# Patient Record
Sex: MALE | Race: WHITE | Employment: FULL TIME | URBAN - METROPOLITAN AREA
[De-identification: names, ages, dates, MRNs, and addresses within clinical notes are randomized per-mention and may not be internally consistent; named-entity substitution may affect disease eponyms.]

---

## 2017-09-05 NOTE — PATIENT INSTRUCTIONS
Preventive Health Recommendations  Male Ages 40 to 49    Yearly exam:             See your health care provider every year in order to  o   Review health changes.   o   Discuss preventive care.    o   Review your medicines if your doctor has prescribed any.    You should be tested each year for STDs (sexually transmitted diseases) if you re at risk.     Have a cholesterol test every 5 years.     Have a colonoscopy (test for colon cancer) if someone in your family has had colon cancer or polyps before age 50.     After age 45, have a diabetes test (fasting glucose). If you are at risk for diabetes, you should have this test every 3 years.      Talk with your health care provider about whether or not a prostate cancer screening test (PSA) is right for you.    Shots: Get a flu shot each year. Get a tetanus shot every 10 years.     Nutrition:    Eat at least 5 servings of fruits and vegetables daily.     Eat whole-grain bread, whole-wheat pasta and brown rice instead of white grains and rice.     Talk to your provider about Calcium and Vitamin D.     Lifestyle    Exercise for at least 150 minutes a week (30 minutes a day, 5 days a week). This will help you control your weight and prevent disease.     Limit alcohol to one drink per day.     No smoking.     Wear sunscreen to prevent skin cancer.     See your dentist every six months for an exam and cleaning.     Review dietary measures that can be used for controlling blood pressure      Jasvir Garcia MD

## 2017-09-07 ENCOUNTER — OFFICE VISIT (OUTPATIENT)
Dept: FAMILY MEDICINE | Facility: CLINIC | Age: 47
End: 2017-09-07
Payer: COMMERCIAL

## 2017-09-07 VITALS
RESPIRATION RATE: 14 BRPM | HEART RATE: 72 BPM | HEIGHT: 71 IN | DIASTOLIC BLOOD PRESSURE: 85 MMHG | BODY MASS INDEX: 25.93 KG/M2 | TEMPERATURE: 98.1 F | SYSTOLIC BLOOD PRESSURE: 135 MMHG | WEIGHT: 185.2 LBS

## 2017-09-07 DIAGNOSIS — H65.21 CHRONIC SEROUS OTITIS MEDIA OF RIGHT EAR: ICD-10-CM

## 2017-09-07 DIAGNOSIS — Z13.6 CARDIOVASCULAR SCREENING; LDL GOAL LESS THAN 160: ICD-10-CM

## 2017-09-07 DIAGNOSIS — Z00.00 ROUTINE GENERAL MEDICAL EXAMINATION AT A HEALTH CARE FACILITY: Primary | ICD-10-CM

## 2017-09-07 PROCEDURE — 99396 PREV VISIT EST AGE 40-64: CPT | Performed by: FAMILY MEDICINE

## 2017-09-07 ASSESSMENT — PAIN SCALES - GENERAL: PAINLEVEL: NO PAIN (0)

## 2017-09-07 NOTE — PROGRESS NOTES
"SUBJECTIVE:   CC: Eriberto Cordon is an 47 year old male who presents for preventative health visit.     Physical   Annual:     Getting at least 3 servings of Calcium per day::  Yes    Bi-annual eye exam::  NO    Dental care twice a year::  Yes    Sleep apnea or symptoms of sleep apnea::  None    Frequency of exercise::  1 day/week    Duration of exercise::  15-30 minutes    Taking medications regularly::  Not Applicable    Additional concerns today::  No            Concerned since his blood pressure was noted to be \"high\" at an automated blood pressure monitor and possible elevation is dental office. Has not been treated for hypertension in the past.    Today's PHQ-2 Score:   PHQ-2 ( 1999 [employer]) 9/7/2017   Q1: Little interest or pleasure in doing things 0   Q2: Feeling down, depressed or hopeless 0   PHQ-2 Score 0   Q1: Little interest or pleasure in doing things Not at all   Q2: Feeling down, depressed or hopeless Not at all   PHQ-2 Score 0       Abuse: Current or Past(Physical, Sexual or Emotional)- No  Do you feel safe in your environment - Yes    Social History   Substance Use Topics     Smoking status: Never Smoker     Smokeless tobacco: Never Used     Alcohol use Yes      Comment: Maybe once a week or twice     The patient does not drink >3 drinks per day nor >7 drinks per week.    Last PSA: No results found for: PSA    Reviewed orders with patient. Reviewed health maintenance and updated orders accordingly - Yes      Reviewed and updated as needed this visit by clinical staff  Tobacco  Allergies  Med Hx  Surg Hx  Fam Hx  Soc Hx        Reviewed and updated as needed this visit by Provider              ROS:  C: NEGATIVE for fever, chills, change in weight  CONSTITUTIONAL: Exercises regularly, mainly resistance training.  I: NEGATIVE for worrisome rashes, moles or lesions  E: NEGATIVE for vision changes or irritation  ENT: NEGATIVE for ear, mouth and throat problems. Has a history of chronic serous otitis " "on the right. Hearing relatively well-maintained. Has seen in ENT.  R: NEGATIVE for significant cough or SOB  B: NEGATIVE for masses, tenderness or discharge  CV: NEGATIVE for chest pain, palpitations or peripheral edema  GI: NEGATIVE for nausea, abdominal pain, heartburn, or change in bowel habits   male: negative for dysuria, hematuria, decreased urinary stream, erectile dysfunction, urethral discharge  M: NEGATIVE for significant arthralgias or myalgia  N: NEGATIVE for weakness, dizziness or paresthesias  E: NEGATIVE for temperature intolerance, skin/hair changes  H: NEGATIVE for bleeding problems  P: NEGATIVE for changes in mood or affect    OBJECTIVE:   /85  Pulse 72  Temp 98.1  F (36.7  C) (Temporal)  Resp 14  Ht 5' 11.26\" (1.81 m)  Wt 185 lb 3.2 oz (84 kg)  BMI 25.64 kg/m2    EXAM:  GENERAL: healthy, alert and no distress  EYES: Eyes grossly normal to inspection, PERRL and conjunctivae and sclerae normal  HENT: ear canals and TM's normal, nose and mouth without ulcers or lesions except for the right tympanic membrane showing a prior scarred area where a PE tube was in place and possible slight serous fluid. No redness.  NECK: no adenopathy, no asymmetry, masses, or scars and thyroid normal to palpation  RESP: lungs clear to auscultation - no rales, rhonchi or wheezes  BREAST: normal without masses, tenderness or nipple discharge and no palpable axillary masses or adenopathy  CV: regular rate and rhythm, normal S1 S2, no S3 or S4, no murmur, click or rub, no peripheral edema and peripheral pulses strong  ABDOMEN: soft, nontender, no hepatosplenomegaly, no masses and bowel sounds normal   (male): normal male genitalia without lesions or urethral discharge, no hernia  RECTAL: Deferred per patient request.  MS: no gross musculoskeletal defects noted, no edema  SKIN: no suspicious lesions or rashes  NEURO: Normal strength and tone, mentation intact and speech normal  PSYCH: mentation appears " "normal, affect normal/bright  LYMPH: no cervical, supraclavicular, axillary, or inguinal adenopathy    ASSESSMENT/PLAN:   1. Routine general medical examination at a health care facility    General physical examination completed. Appears to be stable with a normal blood pressure. Handouts given for dietary suggestions to help improve overall blood pressure control. Follow-up in 1 year. Also offered random blood pressure checks in the clinic as he desires.  - CBC with platelets; Future  - Basic metabolic panel; Future    2. CARDIOVASCULAR SCREENING; LDL GOAL LESS THAN 160  Return for lipid profile.  - Lipid panel reflex to direct LDL; Future    3. Chronic serous otitis media of right ear   To be followed as needed by ENT.       COUNSELING:   Reviewed preventive health counseling, as reflected in patient instructions  Special attention given to:        Regular exercise       Healthy diet/nutrition       Colon cancer screening       Prostate cancer screening    BP Screening:   Last 3 BP Readings:    BP Readings from Last 3 Encounters:   09/07/17 135/85   05/09/16 128/86   03/21/13 134/86       The following was recommended to the patient:  Re-screen BP within a year and recommended lifestyle modifications       reports that he has never smoked. He has never used smokeless tobacco.      Estimated body mass index is 25.64 kg/(m^2) as calculated from the following:    Height as of this encounter: 5' 11.26\" (1.81 m).    Weight as of this encounter: 185 lb 3.2 oz (84 kg).     BMI normal based on body shape and habitus.        Counseling Resources:  ATP IV Guidelines  Pooled Cohorts Equation Calculator  FRAX Risk Assessment  ICSI Preventive Guidelines  Dietary Guidelines for Americans, 2010  USDA's MyPlate  ASA Prophylaxis  Lung CA Screening    Return for fasting blood studies. Follow-up in 1 year or as needed.    The patient understood the rational for the diagnosis and treatment plan. All questions were answered to best of " my ability and the patient's satisfaction.    Note: Chart documentation done in part with Dragon Voice Recognition software. Although reviewed after completion, some word and grammatical errors may remain.  Please consider this when interpreting information in this chart.    Jasvir Garcia MD  [facility]

## 2017-09-07 NOTE — MR AVS SNAPSHOT
After Visit Summary   9/7/2017    Eriberto Cordon    MRN: [id number]           Patient Information     Date Of Birth          1970        Visit Information        Provider Department      9/7/2017 10:20 AM Jasvir Garcia MD [facility]        Today's Diagnoses     Routine general medical examination at a health care facility    -  1    CARDIOVASCULAR SCREENING; LDL GOAL LESS THAN 160        Chronic serous otitis media of right ear          Care Instructions      Preventive Health Recommendations  Male Ages 40 to 49    Yearly exam:             See your health care provider every year in order to  o   Review health changes.   o   Discuss preventive care.    o   Review your medicines if your doctor has prescribed any.    You should be tested each year for STDs (sexually transmitted diseases) if you re at risk.     Have a cholesterol test every 5 years.     Have a colonoscopy (test for colon cancer) if someone in your family has had colon cancer or polyps before age 50.     After age 45, have a diabetes test (fasting glucose). If you are at risk for diabetes, you should have this test every 3 years.      Talk with your health care provider about whether or not a prostate cancer screening test (PSA) is right for you.    Shots: Get a flu shot each year. Get a tetanus shot every 10 years.     Nutrition:    Eat at least 5 servings of fruits and vegetables daily.     Eat whole-grain bread, whole-wheat pasta and brown rice instead of white grains and rice.     Talk to your provider about Calcium and Vitamin D.     Lifestyle    Exercise for at least 150 minutes a week (30 minutes a day, 5 days a week). This will help you control your weight and prevent disease.     Limit alcohol to one drink per day.     No smoking.     Wear sunscreen to prevent skin cancer.     See your dentist every six months for an exam and cleaning.     Review dietary measures that can be used for controlling blood  "pressure      Jasvir Garcia MD               Follow-ups after your visit        Follow-up notes from your care team     Return in about 1 year (around 9/7/2018) for Physical Exam, Lab Work.      Future tests that were ordered for you today     Open Future Orders        Priority Expected Expires Ordered    CBC with platelets Routine 9/8/2017 10/31/2017 9/7/2017    Lipid panel reflex to direct LDL Routine 9/8/2017 10/31/2017 9/7/2017    Basic metabolic panel Routine 9/8/2017 10/31/2017 9/7/2017            Who to contact     If you have questions or need follow up information about today's clinic visit or your schedule please contact [facility] directly at [phone].  Normal or non-critical lab and imaging results will be communicated to you by [employer], letter or phone within 4 business days after the clinic has received the results. If you do not hear from us within 7 days, please contact the clinic through [employer] or phone. If you have a critical or abnormal lab result, we will notify you by phone as soon as possible.  Submit refill requests through [employer] or call your pharmacy and they will forward the refill request to us. Please allow 3 business days for your refill to be completed.          Additional Information About Your Visit        [email or web address] Information     [employer] gives you secure access to your electronic health record. If you see a primary care provider, you can also send messages to your care team and make appointments. If you have questions, please call your primary care clinic.  If you do not have a primary care provider, please call [phone] and they will assist you.        Care EveryWhere ID     This is your Care EveryWhere ID. This could be used by other organizations to access your [place] medical records  [id number]        Your Vitals Were     Pulse Temperature Respirations Height BMI (Body Mass Index)       72 98.1  F (36.7  C) (Temporal) 14 5' 11.26\" (1.81 m) 25.64 kg/m2  "       Blood Pressure from Last 3 Encounters:   09/07/17 135/85   05/09/16 128/86   03/21/13 134/86    Weight from Last 3 Encounters:   09/07/17 185 lb 3.2 oz (84 kg)   05/09/16 182 lb (82.6 kg)   03/21/13 188 lb 12.8 oz (85.6 kg)                 Today's Medication Changes          These changes are accurate as of: 9/7/17 11:09 AM.  If you have any questions, ask your nurse or doctor.               These medicines have changed or have updated prescriptions.        Dose/Directions    scopolamine 72 hr patch   Commonly known as:  TRANSDERM   This may have changed:  Another medication with the same name was removed. Continue taking this medication, and follow the directions you see here.   Used for:  Motion sickness, initial encounter   Changed by:  Ruperto Pillai MD        Place 1 patch onto the skin every 72 hours.  Apply to hairless area behind one ear at least 4 hours before travel.  Remove old patch and change every 3 days.   Quantity:  4 patch   Refills:  0         Stop taking these medicines if you haven't already. Please contact your care team if you have questions.     cephALEXin 500 MG capsule   Commonly known as:  KEFLEX   Stopped by:  Jasvir Garcia MD                    Primary Care Provider Office Phone # Fax #    Ruperto Pillai [phone]       [address]  [facility] [address]        Equal Access to Services     [facility] AH: Hadii migdalia ku hadasho Soomaali, waaxda luqadaha, qaybta kaalmada adeegyada, bladimir maxwell. So [facility] [phone].    ATENCIÓN: Si habla español, tiene a snider disposición servicios gratuitos de asistencia lingüística. Llhipolito al [phone].    We comply with applicable federal civil rights laws and [place] laws. We do not discriminate on the basis of race, color, national origin, age, disability sex, sexual orientation or gender identity.            Thank you!     Thank you for choosing [facility]  for your care. Our goal  is always to provide you with excellent care. Hearing back from our patients is one way we can continue to improve our services. Please take a few minutes to complete the written survey that you may receive in the mail after your visit with us. Thank you!             Your Updated Medication List - Protect others around you: Learn how to safely use, store and throw away your medicines at www.disposemymeds.org.          This list is accurate as of: 9/7/17 11:09 AM.  Always use your most recent med list.                   Brand Name Dispense Instructions for use Diagnosis    fluticasone 50 MCG/ACT spray    FLONASE    1 Package    Spray 1-2 sprays into both nostrils daily.    Ear problem       scopolamine 72 hr patch    TRANSDERM    4 patch    Place 1 patch onto the skin every 72 hours.  Apply to hairless area behind one ear at least 4 hours before travel.  Remove old patch and change every 3 days.    Motion sickness, initial encounter

## 2017-09-07 NOTE — NURSING NOTE
"Chief Complaint   Patient presents with     Panel Management     flu shot     Physical       Initial /88  Pulse 72  Temp 98.1  F (36.7  C) (Temporal)  Resp 14  Ht 5' 11.26\" (1.81 m)  Wt 185 lb 3.2 oz (84 kg)  BMI 25.64 kg/m2 Estimated body mass index is 25.64 kg/(m^2) as calculated from the following:    Height as of this encounter: 5' 11.26\" (1.81 m).    Weight as of this encounter: 185 lb 3.2 oz (84 kg).  Medication Reconciliation: complete   April TARYN Santizo        "

## 2017-09-19 DIAGNOSIS — Z13.6 CARDIOVASCULAR SCREENING; LDL GOAL LESS THAN 160: ICD-10-CM

## 2017-09-19 DIAGNOSIS — Z00.00 ROUTINE GENERAL MEDICAL EXAMINATION AT A HEALTH CARE FACILITY: ICD-10-CM

## 2017-09-19 LAB
ANION GAP SERPL CALCULATED.3IONS-SCNC: 9 MMOL/L (ref 3–14)
BUN SERPL-MCNC: 10 MG/DL (ref 7–30)
CALCIUM SERPL-MCNC: 8.8 MG/DL (ref 8.5–10.1)
CHLORIDE SERPL-SCNC: 104 MMOL/L (ref 94–109)
CHOLEST SERPL-MCNC: 192 MG/DL
CO2 SERPL-SCNC: 27 MMOL/L (ref 20–32)
CREAT SERPL-MCNC: 0.99 MG/DL (ref 0.66–1.25)
ERYTHROCYTE [DISTWIDTH] IN BLOOD BY AUTOMATED COUNT: 13 % (ref 10–15)
GFR SERPL CREATININE-BSD FRML MDRD: 82 ML/MIN/1.7M2
GLUCOSE SERPL-MCNC: 89 MG/DL (ref 70–99)
HCT VFR BLD AUTO: 46.1 % (ref 40–53)
HDLC SERPL-MCNC: 55 MG/DL
HGB BLD-MCNC: 15.4 G/DL (ref 13.3–17.7)
LDLC SERPL CALC-MCNC: 116 MG/DL
MCH RBC QN AUTO: 29.6 PG (ref 26.5–33)
MCHC RBC AUTO-ENTMCNC: 33.4 G/DL (ref 31.5–36.5)
MCV RBC AUTO: 89 FL (ref 78–100)
NONHDLC SERPL-MCNC: 137 MG/DL
PLATELET # BLD AUTO: 238 10E9/L (ref 150–450)
POTASSIUM SERPL-SCNC: 3.9 MMOL/L (ref 3.4–5.3)
RBC # BLD AUTO: 5.21 10E12/L (ref 4.4–5.9)
SODIUM SERPL-SCNC: 140 MMOL/L (ref 133–144)
TRIGL SERPL-MCNC: 107 MG/DL
WBC # BLD AUTO: 8.3 10E9/L (ref 4–11)

## 2017-09-19 PROCEDURE — 36415 COLL VENOUS BLD VENIPUNCTURE: CPT | Performed by: FAMILY MEDICINE

## 2017-09-19 PROCEDURE — 80048 BASIC METABOLIC PNL TOTAL CA: CPT | Performed by: FAMILY MEDICINE

## 2017-09-19 PROCEDURE — 80061 LIPID PANEL: CPT | Performed by: FAMILY MEDICINE

## 2017-09-19 PROCEDURE — 85027 COMPLETE CBC AUTOMATED: CPT | Performed by: FAMILY MEDICINE

## 2017-09-25 ENCOUNTER — TELEPHONE (OUTPATIENT)
Dept: FAMILY MEDICINE | Facility: CLINIC | Age: 47
End: 2017-09-25

## 2017-09-25 NOTE — TELEPHONE ENCOUNTER
Reason for call:  Form  Reason for Call:  Form, our goal is to have forms completed with 72 hours, however, some forms may require a visit or additional information.    Type of letter, form or note:  medical    Who is the form from?: [facility]    Where did the form come from: form was faxed in    What clinic location was the form placed at?: [facility] - [phone]    Where the form was placed:  in box     What number is listed as a contact on the form?: [phone]       Additional comments: Fax to [phone] or email if available to: [email or web address]    Call taken on 11/8/2016 at 3:08 PM by Mary Culver

## 2017-09-26 NOTE — TELEPHONE ENCOUNTER
Forms signed and faxed    Left message asking patient to return call.  Please ask patient what his waist circumference is - and we will fax the form again with that information on it!

## 2017-09-26 NOTE — TELEPHONE ENCOUNTER
Not able to do remotely--could it be reviewed by another provider for completion. Inform the patient if not able to be done and will be done early tomorrow morning.    Jasvir Garcia MD

## 2017-09-26 NOTE — TELEPHONE ENCOUNTER
Pt called again.  I will ask SF to complete the form and fax back asap.    Please call pt once this has been completed.

## 2018-01-29 ENCOUNTER — OFFICE VISIT (OUTPATIENT)
Dept: AUDIOLOGY | Facility: CLINIC | Age: 48
End: 2018-01-29
Payer: COMMERCIAL

## 2018-01-29 ENCOUNTER — OFFICE VISIT (OUTPATIENT)
Dept: OTOLARYNGOLOGY | Facility: CLINIC | Age: 48
End: 2018-01-29
Payer: COMMERCIAL

## 2018-01-29 VITALS
BODY MASS INDEX: 26.32 KG/M2 | HEART RATE: 67 BPM | DIASTOLIC BLOOD PRESSURE: 90 MMHG | WEIGHT: 188 LBS | SYSTOLIC BLOOD PRESSURE: 156 MMHG | HEIGHT: 71 IN

## 2018-01-29 DIAGNOSIS — H90.11 CONDUCTIVE HEARING LOSS OF RIGHT EAR WITH UNRESTRICTED HEARING OF LEFT EAR: Primary | ICD-10-CM

## 2018-01-29 DIAGNOSIS — H65.91 OME (OTITIS MEDIA WITH EFFUSION), RIGHT: Primary | ICD-10-CM

## 2018-01-29 PROCEDURE — 92557 COMPREHENSIVE HEARING TEST: CPT | Performed by: AUDIOLOGIST

## 2018-01-29 PROCEDURE — 99203 OFFICE O/P NEW LOW 30 MIN: CPT | Mod: 25 | Performed by: OTOLARYNGOLOGY

## 2018-01-29 PROCEDURE — 92504 EAR MICROSCOPY EXAMINATION: CPT | Performed by: OTOLARYNGOLOGY

## 2018-01-29 PROCEDURE — 92567 TYMPANOMETRY: CPT | Performed by: AUDIOLOGIST

## 2018-01-29 NOTE — MR AVS SNAPSHOT
After Visit Summary   1/29/2018    Eriberto Cordon    MRN: [id number]           Patient Information     Date Of Birth          1970        Visit Information        Provider Department      1/29/2018 11:30 AM Angela Banegas MD [facility]         Follow-ups after your visit        Your next 10 appointments already scheduled     Feb 26, 2018 11:45 AM CST   Return Visit with Angela Banegas MD   [facility]    [address] [place] [id number] [phone]              Who to contact     If you have questions or need follow up information about today's clinic visit or your schedule please contact [facility] [[email or web address] directly at [phone].  Normal or non-critical lab and imaging results will be communicated to you by "[employer]hart, letter or phone within 4 business days after the clinic has received the results. If you do not hear from us within 7 days, please contact the clinic through "[employer]hart or phone. If you have a critical or abnormal lab result, we will notify you by phone as soon as possible.  Submit refill requests through VouchAR or call your pharmacy and they will forward the refill request to us. Please allow 3 business days for your refill to be completed.          Additional Information About Your Visit        "[employer]hart Information     [employer] gives you secure access to your electronic health record. If you see a primary care provider, you can also send messages to your care team and make appointments. If you have questions, please call your primary care clinic.  If you do not have a primary care provider, please call [phone] and they will assist you.      VouchAR is an electronic gateway that provides easy, online access to your medical records. With [employer], you can request a clinic appointment, read your test results, renew a prescription or communicate with your care team.     To  "access your existing account, please contact your [facility] or call [phone] for assistance.        Care EveryWhere ID     This is your Care EveryWhere ID. This could be used by other organizations to access your [place] medical records  [id number]        Your Vitals Were     Pulse Height BMI (Body Mass Index)             67 1.803 m (5' 11\") 26.22 kg/m2          Blood Pressure from Last 3 Encounters:   01/29/18 156/90   09/07/17 135/85   05/09/16 128/86    Weight from Last 3 Encounters:   01/29/18 85.3 kg (188 lb)   09/07/17 84 kg (185 lb 3.2 oz)   05/09/16 82.6 kg (182 lb)              Today, you had the following     No orders found for display       Primary Care Provider Office Phone # Fax #    Jasvir Garcia [phone] [address] [facility] [address]        Equal Access to Services     [facility] : Hadii aad ku hadasho Soomaali, waaxda luqadaha, qaybta kaalmada adeegyada, waxay idiin hayjaydenn david kharasouth lashira . So [facility] [phone].    ATENCIÓN: Si habla español, tiene a snider disposición servicios gratuitos de asistencia lingüística. LlThe [facility] [phone].    We comply with applicable federal civil rights laws and [place] laws. We do not discriminate on the basis of race, color, national origin, age, disability, sex, sexual orientation, or gender identity.            Thank you!     Thank you for choosing [facility]  for your care. Our goal is always to provide you with excellent care. Hearing back from our patients is one way we can continue to improve our services. Please take a few minutes to complete the written survey that you may receive in the mail after your visit with us. Thank you!             Your Updated Medication List - Protect others around you: Learn how to safely use, store and throw away your medicines at www.disposemymeds.org.          This list is accurate as of 1/29/18 12:06 PM.  Always use your " most recent med list.                   Brand Name Dispense Instructions for use Diagnosis    scopolamine 72 hr patch    TRANSDERM    4 patch    Place 1 patch onto the skin every 72 hours.  Apply to hairless area behind one ear at least 4 hours before travel.  Remove old patch and change every 3 days.    Motion sickness, initial encounter       ANN SOMMER

## 2018-01-29 NOTE — LETTER
2018         RE: Eriberto Cordon  [address] [facility] [address]        Dear Colleague,    Thank you for referring your patient, Eriberto Cordon, to the [facility]. Please see a copy of my visit note below.    Otolaryngology Adult Consultation    Patient: Eriberto Cordon  : 1970      HPI:  Eriberto Cordon is a 47 year old male seen today in the Otolaryngology Clinic for right hearing impairment and possible fluid.  Patient has a long history of right middle ear issues. He's been seen in the past by Dr. Garsia.  He was last seen in . He's had an issue with right otitis media with effusion as well as ear tube placement. He's been treated in the past with auto insufflation as well as Medrol Dosepak and Flonase.    Patient had a cold  patient several weeks ago. At that time he felt his ear get very full. He did try to auto insufflate by Valsalva seen. He's got worried because she heard a pop pain in the sounded like his eardrum was moving around. However since then he's been able to get his ears to feel less full by continuing with some auto insufflation. He is using more of snorting technique to help open up his eardrums. He also doesn't feel like there is any crackling or popping in his ear.    Medications:  Current Outpatient Rx   Medication Sig Dispense Refill     Pseudoephedrine HCl (SUDAFED PO)        scopolamine (TRANSDERM) (1.5mg base/3day) patch Place 1 patch onto the skin every 72 hours.  Apply to hairless area behind one ear at least 4 hours before travel.  Remove old patch and change every 3 days. (Patient not taking: Reported on 2017) 4 patch 0       Allergies: Review of patient's allergies indicates no known allergies.     PMH:  Past Medical History:   Diagnosis Date     Chronic serous otitis media of right ear     Treate with PE tubes as needed     Kidney stones        PSH:  Past Surgical History:   Procedure Laterality Date     C PE TUBE      right side,  several times     ENT SURGERY         FH:  Family History   Problem Relation Age of Onset     Hypertension Father         SH:  Social History   Substance Use Topics     Smoking status: Never Smoker     Smokeless tobacco: Never Used     Alcohol use Yes      Comment: Maybe once a week or twice       Review of Systems  No flowsheet data found.    Physical Exam:    GEN:  The patient is alert, oriented and in no acute distress.  HEAD:  Head, face scalp is grossly normal.  EARS:  under the binocular microscope the ears were visualized. The right ear does show that there is a clear effusion present. Left ear appears normal.     Pertinent previous labs/tests:  Audiogram from January 29, 2018 shows normal hearing on the left. As a mild conductive loss on the right. Normal tympanogram on the left. Flat tympanogram on the right.    Assessment/Plan:patient has a right otitis medial with effusion present. We discussed options for management. This includes observation versus simple myringotomy versus Flonase with Medrol Dosepak which is used in the past. We discussed pros and cons. Patient had a lot of questions about each option. In the end he decided for observation. I think this is reasonable. Particularly since he doesn't have any subjective complaints.  He does feel almost back to normal. I will see him back in 1 month for ear check.    I spent a total of 35 minutes face-to-face with Eriberto Cordon during today's office visit.  Over 50% of this time was spent counseling the patient on and/or coordinating care as documented in my assessment and plan.        Again, thank you for allowing me to participate in the care of your patient.        Sincerely,        Angela Banegas MD

## 2018-01-29 NOTE — NURSING NOTE
"Eriberto Cordon's goals for this visit include:   Chief Complaint   Patient presents with     Consult     Hearing loss       He requests these members of his care team be copied on today's visit information: yes      PCP: Jasvir Garcia    Referring Provider:  No referring provider defined for this encounter.    Chief Complaint   Patient presents with     Consult     Hearing loss       Initial BP (!) 150/92  Pulse 70  Ht 1.803 m (5' 11\")  Wt 85.3 kg (188 lb)  BMI 26.22 kg/m2 Estimated body mass index is 26.22 kg/(m^2) as calculated from the following:    Height as of this encounter: 1.803 m (5' 11\").    Weight as of this encounter: 85.3 kg (188 lb).  Medication Reconciliation: complete   Stephanie Delgado CMA (AAMA)      "

## 2018-01-29 NOTE — PROGRESS NOTES
Otolaryngology Adult Consultation    Patient: Eriberto Cordon  : 1970      HPI:  Eriberto Cordon is a 47 year old male seen today in the Otolaryngology Clinic for right hearing impairment and possible fluid.  Patient has a long history of right middle ear issues. He's been seen in the past by Dr. Garsia.  He was last seen in . He's had an issue with right otitis media with effusion as well as ear tube placement. He's been treated in the past with auto insufflation as well as Medrol Dosepak and Flonase.    Patient had a cold  patient several weeks ago. At that time he felt his ear get very full. He did try to auto insufflate by Valsalva seen. He's got worried because she heard a pop pain in the sounded like his eardrum was moving around. However since then he's been able to get his ears to feel less full by continuing with some auto insufflation. He is using more of snorting technique to help open up his eardrums. He also doesn't feel like there is any crackling or popping in his ear.    Medications:  Current Outpatient Rx   Medication Sig Dispense Refill     Pseudoephedrine HCl (SUDAFED PO)        scopolamine (TRANSDERM) (1.5mg base/3day) patch Place 1 patch onto the skin every 72 hours.  Apply to hairless area behind one ear at least 4 hours before travel.  Remove old patch and change every 3 days. (Patient not taking: Reported on 2017) 4 patch 0       Allergies: Review of patient's allergies indicates no known allergies.     PMH:  Past Medical History:   Diagnosis Date     Chronic serous otitis media of right ear     Treate with PE tubes as needed     Kidney stones        PSH:  Past Surgical History:   Procedure Laterality Date     C PE TUBE      right side, several times     ENT SURGERY         FH:  Family History   Problem Relation Age of Onset     Hypertension Father         SH:  Social History   Substance Use Topics     Smoking status: Never Smoker     Smokeless tobacco: Never Used      Alcohol use Yes      Comment: Maybe once a week or twice       Review of Systems  No flowsheet data found.    Physical Exam:    GEN:  The patient is alert, oriented and in no acute distress.  HEAD:  Head, face scalp is grossly normal.  EARS:  under the binocular microscope the ears were visualized. The right ear does show that there is a clear effusion present. Left ear appears normal.     Pertinent previous labs/tests:  Audiogram from January 29, 2018 shows normal hearing on the left. As a mild conductive loss on the right. Normal tympanogram on the left. Flat tympanogram on the right.    Assessment/Plan:patient has a right otitis medial with effusion present. We discussed options for management. This includes observation versus simple myringotomy versus Flonase with Medrol Dosepak which is used in the past. We discussed pros and cons. Patient had a lot of questions about each option. In the end he decided for observation. I think this is reasonable. Particularly since he doesn't have any subjective complaints.  He does feel almost back to normal. I will see him back in 1 month for ear check.    I spent a total of 35 minutes face-to-face with Eriberto oCrdon during today's office visit.  Over 50% of this time was spent counseling the patient on and/or coordinating care as documented in my assessment and plan.

## 2018-01-29 NOTE — MR AVS SNAPSHOT
After Visit Summary   1/29/2018    Eriberto Cordon    MRN: [id number]           Patient Information     Date Of Birth          1970        Visit Information        Provider Department      1/29/2018 11:00 AM Cesar Lopez [facility]        Today's Diagnoses     Conductive hearing loss of right ear with unrestricted hearing of left ear    -  1       Follow-ups after your visit        Who to contact     If you have questions or need follow up information about today's clinic visit or your schedule please contact [facility] directly at [phone].  Normal or non-critical lab and imaging results will be communicated to you by [employer], letter or phone within 4 business days after the clinic has received the results. If you do not hear from us within 7 days, please contact the clinic through [employer] or phone. If you have a critical or abnormal lab result, we will notify you by phone as soon as possible.  Submit refill requests through [employer] or call your pharmacy and they will forward the refill request to us. Please allow 3 business days for your refill to be completed.          Additional Information About Your Visit        MyChart Information     [employer] gives you secure access to your electronic health record. If you see a primary care provider, you can also send messages to your care team and make appointments. If you have questions, please call your primary care clinic.  If you do not have a primary care provider, please call [phone] and they will assist you.      [employer] is an electronic gateway that provides easy, online access to your medical records. With [employer], you can request a clinic appointment, read your test results, renew a prescription or communicate with your care team.     To access your existing account, please contact your [facility] or call [phone] for assistance.        Care EveryWhere ID      This is your Care EveryWhere ID. This could be used by other organizations to access your [place] medical records  [id number]         Blood Pressure from Last 3 Encounters:   01/29/18 (!) 150/92   09/07/17 135/85   05/09/16 128/86    Weight from Last 3 Encounters:   01/29/18 188 lb (85.3 kg)   09/07/17 185 lb 3.2 oz (84 kg)   05/09/16 182 lb (82.6 kg)              We Performed the Following     AUDIOGRAM/TYMPANOGRAM - INTERFACE     COMPREHENSIVE HEARING TEST     TYMPANOMETRY        Primary Care Provider Office Phone # Fax #    Jasvir Garcia [phone] [address] [facility] [address]        Equal Access to Services     DAMARIS MCKAY : Hadii aad ku hadasho Soomaali, waaxda luqadaha, qaybta kaalmada adeegyada, waxay idiin hayaan david dick . So [facility] [phone].    ATENCIÓN: Si habla español, tiene a snider disposición servicios gratuitos de asistencia lingüística. [facility] [phone].    We comply with applicable federal civil rights laws and [place] laws. We do not discriminate on the basis of race, color, national origin, age, disability, sex, sexual orientation, or gender identity.            Thank you!     Thank you for choosing [facility]  for your care. Our goal is always to provide you with excellent care. Hearing back from our patients is one way we can continue to improve our services. Please take a few minutes to complete the written survey that you may receive in the mail after your visit with us. Thank you!             Your Updated Medication List - Protect others around you: Learn how to safely use, store and throw away your medicines at www.disposemymeds.org.          This list is accurate as of 1/29/18 11:37 AM.  Always use your most recent med list.                   Brand Name Dispense Instructions for use Diagnosis    scopolamine 72 hr patch    TRANSDERM    4 patch    Place 1 patch onto the skin every 72 hours.  Apply to hairless  area behind one ear at least 4 hours before travel.  Remove old patch and change every 3 days.    Motion sickness, initial encounter       ANN SOMMER

## 2018-08-02 DIAGNOSIS — T75.3XXA MOTION SICKNESS, INITIAL ENCOUNTER: ICD-10-CM

## 2018-08-02 RX ORDER — SCOLOPAMINE TRANSDERMAL SYSTEM 1 MG/1
PATCH, EXTENDED RELEASE TRANSDERMAL
Qty: 4 PATCH | Refills: 1 | Status: SHIPPED | OUTPATIENT
Start: 2018-08-02 | End: 2019-05-24

## 2018-08-02 NOTE — TELEPHONE ENCOUNTER
Routing refill request to provider for review/approval because:  Drug not on the [facility] refill protocol     scopolamine (TRANSDERM) (1.5mg base/3day) patch 4 patch 0 7/29/2016  No   Sig: Place 1 patch onto the skin every 72 hours.  Apply to hairless area behind one ear at least 4 hours before travel.  Remove old patch and change every 3 days.     Tram Zambrano RN

## 2018-11-06 ENCOUNTER — ALLIED HEALTH/NURSE VISIT (OUTPATIENT)
Dept: PEDIATRICS | Facility: CLINIC | Age: 48
End: 2018-11-06
Payer: COMMERCIAL

## 2018-11-06 DIAGNOSIS — Z23 NEED FOR PROPHYLACTIC VACCINATION AND INOCULATION AGAINST INFLUENZA: Primary | ICD-10-CM

## 2018-11-06 PROCEDURE — 90686 IIV4 VACC NO PRSV 0.5 ML IM: CPT

## 2018-11-06 PROCEDURE — 90471 IMMUNIZATION ADMIN: CPT

## 2018-11-06 PROCEDURE — 99207 ZZC NO CHARGE NURSE ONLY: CPT

## 2018-11-06 NOTE — MR AVS SNAPSHOT
After Visit Summary   11/6/2018    Eriberto Cordon    MRN: [id number]           Patient Information     Date Of Birth          1970        Visit Information        Provider Department      11/6/2018 2:10 PM MG RN VISITS [facility]        Today's Diagnoses     Need for prophylactic vaccination and inoculation against influenza    -  1       Follow-ups after your visit        Your next 10 appointments already scheduled     Nov 06, 2018  2:10 PM CST   Nurse Only with MG RN VISITS   [facility] ([facility]    [address] [place] [id number] [phone]              Who to contact     If you have questions or need follow up information about today's clinic visit or your schedule please contact [facility] directly at [phone].  Normal or non-critical lab and imaging results will be communicated to you by [employer], letter or phone within 4 business days after the clinic has received the results. If you do not hear from us within 7 days, please contact the clinic through [employer] or phone. If you have a critical or abnormal lab result, we will notify you by phone as soon as possible.  Submit refill requests through [employer] or call your pharmacy and they will forward the refill request to us. Please allow 3 business days for your refill to be completed.          Additional Information About Your Visit        [employer] Information     [employer] gives you secure access to your electronic health record. If you see a primary care provider, you can also send messages to your care team and make appointments. If you have questions, please call your primary care clinic.  If you do not have a primary care provider, please call [phone] and they will assist you.      [employer] is an electronic gateway that provides easy, online access to your medical records. With [employer], you can request a clinic appointment, read your test  results, renew a prescription or communicate with your care team.     To access your existing account, please contact your [facility] or call [phone] for assistance.        Care EveryWhere ID     This is your Care EveryWhere ID. This could be used by other organizations to access your [place] medical records  [id number]         Blood Pressure from Last 3 Encounters:   01/29/18 156/90   09/07/17 135/85   05/09/16 128/86    Weight from Last 3 Encounters:   01/29/18 188 lb (85.3 kg)   09/07/17 185 lb 3.2 oz (84 kg)   05/09/16 182 lb (82.6 kg)              We Performed the Following     FLU VACCINE, SPLIT VIRUS, IM (QUADRIVALENT) [[address]]- >3 YRS     Vaccine Administration, Initial [[address]]        Primary Care Provider Office Phone # Fax #    Jasvir Garcia [phone] [address] [facility] [address]        Equal Access to Services     DAMARIS MCKAY : Hadii aad ku hadasho Soomaali, waaxda luqadaha, qaybta kaalmada adeegyada, waxay idiin hayaan lubnaeg cassandra dick . So [facility] [phone].    ATENCIÓN: Si habla español, tiene a snider disposición servicios gratuitos de asistencia lingüística. [facility] [phone].    We comply with applicable federal civil rights laws and [place] laws. We do not discriminate on the basis of race, color, national origin, age, disability, sex, sexual orientation, or gender identity.            Thank you!     Thank you for choosing [facility]  for your care. Our goal is always to provide you with excellent care. Hearing back from our patients is one way we can continue to improve our services. Please take a few minutes to complete the written survey that you may receive in the mail after your visit with us. Thank you!             Your Updated Medication List - Protect others around you: Learn how to safely use, store and throw away your medicines at www.disposemymeds.org.          This list is accurate  as of 11/6/18  2:03 PM.  Always use your most recent med list.                   Brand Name Dispense Instructions for use Diagnosis    scopolamine 72 hr patch    TRANSDERM    4 patch    Place 1 patch onto the skin every 72 hours.  Apply to hairless area behind one ear at least 4 hours before travel.  Remove old patch and change every 3 days.    Motion sickness, initial encounter       ANN SOMMER

## 2018-11-06 NOTE — PROGRESS NOTES

## 2019-05-01 ENCOUNTER — OFFICE VISIT (OUTPATIENT)
Dept: PEDIATRICS | Facility: CLINIC | Age: 49
End: 2019-05-01
Payer: COMMERCIAL

## 2019-05-01 VITALS
BODY MASS INDEX: 27.2 KG/M2 | SYSTOLIC BLOOD PRESSURE: 145 MMHG | WEIGHT: 190 LBS | TEMPERATURE: 97.9 F | HEART RATE: 72 BPM | OXYGEN SATURATION: 98 % | HEIGHT: 70 IN | DIASTOLIC BLOOD PRESSURE: 88 MMHG

## 2019-05-01 DIAGNOSIS — B07.0 PLANTAR WARTS: Primary | ICD-10-CM

## 2019-05-01 DIAGNOSIS — R03.0 ELEVATED BP WITHOUT DIAGNOSIS OF HYPERTENSION: ICD-10-CM

## 2019-05-01 PROCEDURE — 99214 OFFICE O/P EST MOD 30 MIN: CPT | Performed by: FAMILY MEDICINE

## 2019-05-01 ASSESSMENT — PAIN SCALES - GENERAL: PAINLEVEL: SEVERE PAIN (7)

## 2019-05-01 ASSESSMENT — MIFFLIN-ST. JEOR: SCORE: 1743.08

## 2019-05-01 NOTE — PATIENT INSTRUCTIONS
Call to schedule for skin consult in 1-2 weeks  Start checking BP 1-2 times daily  Schedule for BP recheck in 4 weeks

## 2019-05-01 NOTE — PROGRESS NOTES
SUBJECTIVE:   Eriberto Cordon is a 48 year old male who presents to clinic today for the following   health issues:        Concern - Wart  Patient is new to the provider, is here today with concerns of having persistent, painful warty skin lesion of the bottom of the left foot for the past 6 months  Patient has tried over-the-counter medications including presents with no relief  Onset: since winter    Description:   Wart on bottom of left foot    Intensity: moderate    Progression of Symptoms:  worsening    Accompanying Signs & Symptoms:  Pain when walking    Previous history of similar problem:   None    Precipitating factors:   Worsened by: walking    Alleviating factors:  Improved by: none    Therapies Tried and outcome: OTC treatment    ELEVATED BP with no history of hypertension-patient has recent elevated blood pressures noted at the dentist office  Has family history of hypertension and father  Patient denies history of smoking, recreational drug use  Drinks mixed alcohol drinks 2-3 over the weekends  Denies concerns for snoring, sleep apnea  Patient denies ongoing NSAID use   Denies chest pain, SOB, edema legs, visual concerns, focal neurological symptoms, dizziness, syncope, palpitations.  Denies family or personal history of thyroid disorders          Additional history: as documented    Reviewed  and updated as needed this visit by clinical staff         Reviewed and updated as needed this visit by Provider         Patient Active Problem List   Diagnosis     CARDIOVASCULAR SCREENING; LDL GOAL LESS THAN 160     Chronic serous otitis media of right ear     Elevated BP without diagnosis of hypertension     Plantar warts     Past Surgical History:   Procedure Laterality Date     C PE TUBE  2008    right side, several times     ENT SURGERY         Social History     Tobacco Use     Smoking status: Never Smoker     Smokeless tobacco: Never Used   Substance Use Topics     Alcohol use: Yes     Comment: Maybe  "once a week or twice     Family History   Problem Relation Age of Onset     Hypertension Father          Current Outpatient Medications   Medication Sig Dispense Refill     Blood Pressure Monitor KIT Use to check BP twice daily 1 kit 0     Pseudoephedrine HCl (SUDAFED PO)        scopolamine (TRANSDERM) 72 hr patch Place 1 patch onto the skin every 72 hours.  Apply to hairless area behind one ear at least 4 hours before travel.  Remove old patch and change every 3 days. (Patient not taking: Reported on 5/1/2019) 4 patch 1     No Known Allergies  Recent Labs   Lab Test 09/19/17  1423 12/04/12  1553 08/23/11  1230   * 99 104   HDL 55 50 55   TRIG 107 76 92   CR 0.99  --  1.09   GFRESTIMATED 82  --  75   GFRESTBLACK >90  --  >90   POTASSIUM 3.9  --  4.3      BP Readings from Last 3 Encounters:   05/01/19 145/88   01/29/18 156/90   09/07/17 135/85    Wt Readings from Last 3 Encounters:   05/01/19 86.2 kg (190 lb)   01/29/18 85.3 kg (188 lb)   09/07/17 84 kg (185 lb 3.2 oz)                  Labs reviewed in EPIC    ROS:  CONSTITUTIONAL: NEGATIVE for fever, chills, change in weight  INTEGUMENTARY/SKIN: as above  EYES: NEGATIVE for vision changes or irritation  RESP: NEGATIVE for significant cough or SOB  CV: NEGATIVE for chest pain, palpitations or peripheral edema  CV: as above  GI: NEGATIVE for nausea, abdominal pain, heartburn, or change in bowel habits  : negative for dysuria, hematuria, decreased urinary stream, erectile dysfunction  MUSCULOSKELETAL: NEGATIVE for significant arthralgias or myalgia  NEURO: NEGATIVE for weakness, dizziness or paresthesias  ENDOCRINE: NEGATIVE for temperature intolerance, skin/hair changes  HEME/ALLERGY/IMMUNE: NEGATIVE for bleeding problems  PSYCHIATRIC: NEGATIVE for changes in mood or affect    OBJECTIVE:     /88   Pulse 72   Temp 97.9  F (36.6  C) (Oral)   Ht 1.778 m (5' 10\")   Wt 86.2 kg (190 lb)   SpO2 98%   BMI 27.26 kg/m    Body mass index is 27.26 " kg/m .  GENERAL: healthy, alert and no distress  NECK: no adenopathy, no asymmetry, masses, or scars and thyroid normal to palpation  RESP: lungs clear to auscultation - no rales, rhonchi or wheezes  CV: regular rate and rhythm, normal S1 S2, no S3 or S4, no murmur, click or rub, no peripheral edema and peripheral pulses strong  MS: no gross musculoskeletal defects noted, no edema  SKIN: About 1 to 1.5 cm, large wart, tender to touch over the ball of left foot  PSYCH: mentation appears normal, affect normal/bright    Diagnostic Test Results:  none     ASSESSMENT/PLAN:             1. Plantar warts  Due to failure of over-the-counter treatment and face of the wart, patient was recommended to follow-up with dermatology for possible intralesional Candida injection  Patient verbalised understanding and is agreeable to the plan.    - DERMATOLOGY REFERRAL    2. Elevated BP without diagnosis of hypertension  BP Readings from Last 6 Encounters:   05/01/19 145/88   01/29/18 156/90   09/07/17 135/85   05/09/16 128/86   03/21/13 134/86   12/04/12 118/71     Initial blood pressure was 160/92, rechecked-143/88  1 Limit alcohol consumption to less than 2 drinks per day (1 drink=5 oz.wine, 12 oz. Beer or 1.5 oz. 80-proof liquor).  Recommended low-salt diet, regular exercises  Recommended to start taking blood pressure 1-2 times a day at home,   follow-up-  for blood pressure recheck in 4 weeks  Patient verbalised understanding and is agreeable to the plan.    Blood Pressure Monitor KIT; Use to check BP twice daily  Dispense: 1 kit; Refill: 0    Chart documentation done in part with Dragon Voice recognition Software. Although reviewed after completion, some word and grammatical error may remain.    See Patient Instructions    Andrews Hightower MD  [facility]

## 2019-05-02 ENCOUNTER — TELEPHONE (OUTPATIENT)
Dept: DERMATOLOGY | Facility: CLINIC | Age: 49
End: 2019-05-02

## 2019-05-02 NOTE — TELEPHONE ENCOUNTER
This cma talked with patient, scheduled patient with dr. gibson at 130pm     Amorrry Carroll CMA

## 2019-05-03 ENCOUNTER — OFFICE VISIT (OUTPATIENT)
Dept: DERMATOLOGY | Facility: CLINIC | Age: 49
End: 2019-05-03
Payer: COMMERCIAL

## 2019-05-03 DIAGNOSIS — B07.0 PLANTAR WARTS: Primary | ICD-10-CM

## 2019-05-03 PROCEDURE — 17110 DESTRUCTION B9 LES UP TO 14: CPT | Performed by: DERMATOLOGY

## 2019-05-03 ASSESSMENT — PAIN SCALES - GENERAL: PAINLEVEL: NO PAIN (0)

## 2019-05-03 NOTE — NURSING NOTE
Eriberto Cordon's goals for this visit include:   Chief Complaint   Patient presents with     Derm Problem     Wart on foot     He requests these members of his care team be copied on today's visit information:     PCP: Jasvir Garcia    Referring Provider:  Andrews Hightower MD  [address] [place] [address]    There were no vitals taken for this visit.    Do you need any medication refills at today's visit? No

## 2019-05-03 NOTE — PATIENT INSTRUCTIONS
Cryotherapy    What is it?    Use of a very cold liquid, such as liquid nitrogen, to freeze and destroy abnormal skin cells that need to be removed    What should I expect?    Tenderness and redness    A small blister that might grow and fill with dark purple blood. There may be crusting.    More than one treatment may be needed if the lesions do not go away.    How do I care for the treated area?    Gently wash the area with your hands when bathing.    Use a thin layer of Vaseline to help with healing. You may use a Band-Aid.     The area should heal within 7-10 days and may leave behind a pink or lighter color.     Do not use an antibiotic or Neosporin ointment.     You may take acetaminophen (Tylenol) for pain.     Call your Doctor if you have:    Severe pain    Signs of infection (warmth, redness, cloudy yellow drainage, and or a bad smell)    Questions or concerns    Who should I call with questions?       [facility]: [phone]       [facility]: [phone]       For urgent needs outside of business hours call the [facility] at [phone]        and ask for the dermatology resident on call

## 2019-05-03 NOTE — LETTER
5/3/2019         RE: Eriberto Cordon  [id number] [facility] [address]        Dear Colleague,    Thank you for referring your patient, Eriberto Cordon, to the [facility]. Please see a copy of my visit note below.    [facility] Dermatology Note      Dermatology Problem List:  1. Verruca plantaris   - s/p cryotherapy     Encounter Date: May 3, 2019    CC:  Chief Complaint   Patient presents with     Derm Problem     Wart on foot         History of Present Illness:  Mr. Eriberto Cordon is a 48 year old male who presents today for wart evaluation. This is the patient's first visit in our dermatology clinic. Today he reports a wart on the ball of his left foot. This has been making it painful to walk. He has tried an OTC freezing product and Compound-W. He has also soaked his foot and shaved it down. The patient is otherwise feeling well. There are no other skin concerns at this time.       Past Medical History:   Patient Active Problem List   Diagnosis     CARDIOVASCULAR SCREENING; LDL GOAL LESS THAN 160     Chronic serous otitis media of right ear     Elevated BP without diagnosis of hypertension     Plantar warts     Past Medical History:   Diagnosis Date     Chronic serous otitis media of right ear     Treate with PE tubes as needed     Kidney stones      Past Surgical History:   Procedure Laterality Date     C PE TUBE  2008    right side, several times     ENT SURGERY         Social History:  Social History     Socioeconomic History     Marital status:      Spouse name: None     Number of children: None     Years of education: None     Highest education level: None   Occupational History     None   Social Needs     Financial resource strain: None     Food insecurity:     Worry: None     Inability: None     Transportation needs:     Medical: None     Non-medical: None   Tobacco Use     Smoking status: Never Smoker     Smokeless tobacco: Never Used   Substance and Sexual  Activity     Alcohol use: Yes     Comment: Maybe once a week or twice     Drug use: No     Sexual activity: Yes     Partners: Female   Lifestyle     Physical activity:     Days per week: None     Minutes per session: None     Stress: None   Relationships     Social connections:     Talks on phone: None     Gets together: None     Attends Mandaen service: None     Active member of club or organization: None     Attends meetings of clubs or organizations: None     Relationship status: None     Intimate partner violence:     Fear of current or ex partner: None     Emotionally abused: None     Physically abused: None     Forced sexual activity: None   Other Topics Concern     Parent/sibling w/ CABG, MI or angioplasty before 65F 55M? No      Service No     Blood Transfusions No     Caffeine Concern No     Occupational Exposure No     Hobby Hazards No     Sleep Concern No     Stress Concern No     Weight Concern No     Special Diet No     Back Care No     Exercise Yes     Comment: lift weights, runs, 2-3 times a week     Bike Helmet No     Seat Belt Yes     Self-Exams Yes   Social History Narrative     with 2 children.       Family History:  Family History   Problem Relation Age of Onset     Hypertension Father      Melanoma No family hx of      Skin Cancer No family hx of        Medications:  Current Outpatient Medications   Medication Sig Dispense Refill     Blood Pressure Monitor KIT Use to check BP twice daily 1 kit 0     Pseudoephedrine HCl (SUDAFED PO)        scopolamine (TRANSDERM) 72 hr patch Place 1 patch onto the skin every 72 hours.  Apply to hairless area behind one ear at least 4 hours before travel.  Remove old patch and change every 3 days. (Patient not taking: Reported on 5/1/2019) 4 patch 1       No Known Allergies    Review of Systems:  -Skin Establ Pt: The patient denies any new rash, pruritus, or lesions that are symptomatic, changing or bleeding, except as per HPI.  -Constitutional:  The patient is feeling generally well.    Physical exam:  Vitals: There were no vitals taken for this visit.  GEN: This is a well developed, well-nourished male in no acute distress, in a pleasant mood.    SKIN: Focused examination of the bilateral feet was performed.  - Verrucous papule with thrombosed capillaries interrupting dermatoglyphics on the left plantar foot.   - No other lesions of concern on areas examined.       Impression/Plan:  1. Verruca plantaris - left plantar foot, about 1.3cm, very thick, painful with walkig    Discussed options including candida vs cryotherapy.   Prep with alcohol and pared with 11 blade. Lesion was pared and aluminum chloride was used to stop bleeding.  Cryotherapy procedure note: After verbal consent and discussion of risks and benefits including but no limited to dyspigmentation/scar, blister, and pain, 1 was treated with 1-2mm freeze border for 2 cycles with liquid nitrogen. Post cryotherapy instructions were provided.  Mediplast given     Follow-up in 3 weeks, earlier as needed.       Staff Involved:    Scribe Disclosure  I, Misbah Puri, am serving as a scribe to document services personally performed by Dr. Aziza Stewart MD, based on data collection and the provider's statements to me.     Provider Disclosure:   The documentation recorded by the scribe accurately reflects the services I personally performed and the decisions made by me.    Aziza Stewart MD    Department of Dermatology  [facility]: Phone: [phone], Fax:[phone]  [facility]: Phone: [phone], Fax: [phone]              Again, thank you for allowing me to participate in the care of your patient.        Sincerely,        Aziza Stewart MD

## 2019-05-03 NOTE — PROGRESS NOTES
[facility] Dermatology Note      Dermatology Problem List:  1. Verruca plantaris   - s/p cryotherapy     Encounter Date: May 3, 2019    CC:  Chief Complaint   Patient presents with     Derm Problem     Wart on foot         History of Present Illness:  Mr. Eriberto Cordon is a 48 year old male who presents today for wart evaluation. This is the patient's first visit in our dermatology clinic. Today he reports a wart on the ball of his left foot. This has been making it painful to walk. He has tried an OTC freezing product and Compound-W. He has also soaked his foot and shaved it down. The patient is otherwise feeling well. There are no other skin concerns at this time.       Past Medical History:   Patient Active Problem List   Diagnosis     CARDIOVASCULAR SCREENING; LDL GOAL LESS THAN 160     Chronic serous otitis media of right ear     Elevated BP without diagnosis of hypertension     Plantar warts     Past Medical History:   Diagnosis Date     Chronic serous otitis media of right ear     Treate with PE tubes as needed     Kidney stones      Past Surgical History:   Procedure Laterality Date     C PE TUBE  2008    right side, several times     ENT SURGERY         Social History:  Social History     Socioeconomic History     Marital status:      Spouse name: None     Number of children: None     Years of education: None     Highest education level: None   Occupational History     None   Social Needs     Financial resource strain: None     Food insecurity:     Worry: None     Inability: None     Transportation needs:     Medical: None     Non-medical: None   Tobacco Use     Smoking status: Never Smoker     Smokeless tobacco: Never Used   Substance and Sexual Activity     Alcohol use: Yes     Comment: Maybe once a week or twice     Drug use: No     Sexual activity: Yes     Partners: Female   Lifestyle     Physical activity:     Days per week: None     Minutes per session: None     Stress: None    Relationships     Social connections:     Talks on phone: None     Gets together: None     Attends Mosque service: None     Active member of club or organization: None     Attends meetings of clubs or organizations: None     Relationship status: None     Intimate partner violence:     Fear of current or ex partner: None     Emotionally abused: None     Physically abused: None     Forced sexual activity: None   Other Topics Concern     Parent/sibling w/ CABG, MI or angioplasty before 65F 55M? No      Service No     Blood Transfusions No     Caffeine Concern No     Occupational Exposure No     Hobby Hazards No     Sleep Concern No     Stress Concern No     Weight Concern No     Special Diet No     Back Care No     Exercise Yes     Comment: lift weights, runs, 2-3 times a week     Bike Helmet No     Seat Belt Yes     Self-Exams Yes   Social History Narrative     with 2 children.       Family History:  Family History   Problem Relation Age of Onset     Hypertension Father      Melanoma No family hx of      Skin Cancer No family hx of        Medications:  Current Outpatient Medications   Medication Sig Dispense Refill     Blood Pressure Monitor KIT Use to check BP twice daily 1 kit 0     Pseudoephedrine HCl (SUDAFED PO)        scopolamine (TRANSDERM) 72 hr patch Place 1 patch onto the skin every 72 hours.  Apply to hairless area behind one ear at least 4 hours before travel.  Remove old patch and change every 3 days. (Patient not taking: Reported on 5/1/2019) 4 patch 1       No Known Allergies    Review of Systems:  -Skin Establ Pt: The patient denies any new rash, pruritus, or lesions that are symptomatic, changing or bleeding, except as per HPI.  -Constitutional: The patient is feeling generally well.    Physical exam:  Vitals: There were no vitals taken for this visit.  GEN: This is a well developed, well-nourished male in no acute distress, in a pleasant mood.    SKIN: Focused examination of the  bilateral feet was performed.  - Verrucous papule with thrombosed capillaries interrupting dermatoglyphics on the left plantar foot.   - No other lesions of concern on areas examined.       Impression/Plan:  1. Verruca plantaris - left plantar foot, about 1.3cm, very thick, painful with walkig    Discussed options including candida vs cryotherapy.   Prep with alcohol and pared with 11 blade. Lesion was pared and aluminum chloride was used to stop bleeding.  Cryotherapy procedure note: After verbal consent and discussion of risks and benefits including but no limited to dyspigmentation/scar, blister, and pain, 1 was treated with 1-2mm freeze border for 2 cycles with liquid nitrogen. Post cryotherapy instructions were provided.  Mediplast given     Follow-up in 3 weeks, earlier as needed.       Staff Involved:    Scribe Disclosure  I, Misbah Puri, am serving as a scribe to document services personally performed by Dr. Aziza Stewart MD, based on data collection and the provider's statements to me.     Provider Disclosure:   The documentation recorded by the scribe accurately reflects the services I personally performed and the decisions made by me.    Aziza Stewart MD    Department of Dermatology  [facility]: Phone: [phone], Fax:[phone]  [facility]: Phone: [phone], Fax: [phone]

## 2019-05-06 ENCOUNTER — TELEPHONE (OUTPATIENT)
Dept: PEDIATRICS | Facility: CLINIC | Age: 49
End: 2019-05-06

## 2019-05-06 NOTE — TELEPHONE ENCOUNTER
Left message for patient to return clinic call regarding scheduling. Patient needs a Return  appointment for bp medication recheck with Andrews Hightower MD on or around 5/29/19. Number to clinic and Mychart option given, please assist in scheduling once patient returns clinic call.     Call Center OKAY TO SCHEDULE.    Thanks,   Rebecca Metz  Primary Care   [facility]

## 2019-05-14 NOTE — TELEPHONE ENCOUNTER
We can start with nurse visit and switch to provider visit if BP is elevated and needs to be addressed

## 2019-05-14 NOTE — TELEPHONE ENCOUNTER
Spoke with patient to schedule Return visit with provider for BP recheck around 5/29/19 per last visit instructions.  Patient is just starting to get regular about checking at home.  He is unsure if he has enough data to bring back to a visit with you around that time, wonders if he should delay a couple weeks.  He also asked if it could be a nurse visit instead.     Routing to Dr Hightower for review and advice.    Rebecca Metz  Primary Care   [facility]

## 2019-05-14 NOTE — TELEPHONE ENCOUNTER
Called and scheduled a nurse visit on 5/24, but he is aware that if his BP is high that he may need to see Dr. Hightower.  Eveline Browning RN

## 2019-05-24 ENCOUNTER — ALLIED HEALTH/NURSE VISIT (OUTPATIENT)
Dept: PEDIATRICS | Facility: CLINIC | Age: 49
End: 2019-05-24
Payer: COMMERCIAL

## 2019-05-24 ENCOUNTER — OFFICE VISIT (OUTPATIENT)
Dept: DERMATOLOGY | Facility: CLINIC | Age: 49
End: 2019-05-24
Payer: COMMERCIAL

## 2019-05-24 VITALS — SYSTOLIC BLOOD PRESSURE: 146 MMHG | DIASTOLIC BLOOD PRESSURE: 98 MMHG

## 2019-05-24 DIAGNOSIS — B07.0 PLANTAR WARTS: Primary | ICD-10-CM

## 2019-05-24 DIAGNOSIS — R03.0 ELEVATED BP WITHOUT DIAGNOSIS OF HYPERTENSION: Primary | ICD-10-CM

## 2019-05-24 PROCEDURE — 99211 OFF/OP EST MAY X REQ PHY/QHP: CPT

## 2019-05-24 PROCEDURE — 17110 DESTRUCTION B9 LES UP TO 14: CPT | Performed by: DERMATOLOGY

## 2019-05-24 ASSESSMENT — PAIN SCALES - GENERAL: PAINLEVEL: NO PAIN (0)

## 2019-05-24 NOTE — PROGRESS NOTES
[facility] Dermatology Note      Dermatology Problem List:  1. Verruca plantaris   - s/p cryotherapy     Encounter Date: May 24, 2019    CC:  Chief Complaint   Patient presents with     Wart     plantar wart left foot         History of Present Illness:  Mr. Eriberto Cordon is a 48 year old male who presents today for wart evaluation on his left foot. The patient was last on 5/3/19 when the wart was treated with cryotherapy. Today he reports that he doesn't notice any change. No major limping with previous treatment and didn't blister. He has been shaving it and using Compound-W. He has not been using the prescription. There are no other skin concerns at this time.       Past Medical History:   Patient Active Problem List   Diagnosis     CARDIOVASCULAR SCREENING; LDL GOAL LESS THAN 160     Chronic serous otitis media of right ear     Elevated BP without diagnosis of hypertension     Plantar warts     Past Medical History:   Diagnosis Date     Chronic serous otitis media of right ear     Treate with PE tubes as needed     Kidney stones      Past Surgical History:   Procedure Laterality Date     C PE TUBE  2008    right side, several times     ENT SURGERY         Social History:  Not reviewed today    Family History:  Family History   Problem Relation Age of Onset     Hypertension Father      Melanoma No family hx of      Skin Cancer No family hx of        Medications:  Current Outpatient Medications   Medication Sig Dispense Refill     Blood Pressure Monitor KIT Use to check BP twice daily 1 kit 0       No Known Allergies    Review of Systems:  -not obtained  Physical exam:  Vitals: There were no vitals taken for this visit.  GEN: This is a well developed, well-nourished male in no acute distress, in a pleasant mood.    SKIN: Focused examination of the bilateral feet was performed.  - 1 cm verrucous papule with thrombosed capillaries interrupting dermatoglyphics on the left plantar foot   - No other  lesions of concern on areas examined.       Impression/Plan:  1. Verruca plantaris - left plantar foot, about 1 cm, decrease in size  Cryotherapy procedure note: After verbal consent and discussion of risks and benefits including but no limited to dyspigmentation/scar, blister, and pain, 1 was treated with 1-2mm freeze border for 2 cycles with liquid nitrogen. Post cryotherapy instructions were provided.  Mediplast recommended if affordable  photobtained    Follow-up in 2 weeks for another round of cryo, earlier for new or changing lesions.       Staff Involved:  Scribe/Staff    Scribe Disclosure  I, Jessica Mejia, am serving as a scribe to document services personally performed by Dr. Aziza Stewart MD, based on data collection and the provider's statements to me.     Provider Disclosure:   The documentation recorded by the scribe accurately reflects the services I personally performed and the decisions made by me.    Aziza Stewart MD    Department of Dermatology  [facility]: Phone: [phone], Fax:[phone]  [facility]: Phone: [phone], Fax: [phone]

## 2019-05-24 NOTE — NURSING NOTE
Eriberto Cordon's goals for this visit include:   Chief Complaint   Patient presents with     Wart     plantar wart left foot       He requests these members of his care team be copied on today's visit information:     PCP: Jasvir Garcia    Referring Provider:  No referring provider defined for this encounter.    There were no vitals taken for this visit.    Do you need any medication refills at today's visit? Gay Devine LPN

## 2019-05-24 NOTE — PROGRESS NOTES
"Eriberto Cordon comes into clinic today at the request of Dr. Hightower, ordering provider, for a blood pressure check.    Today's readings:  #1 167/103 (pt's home monitor), #2: 158/106 (manual), #3 158/101 (pt's home monitor)  #4: BP (!) 146/98 (BP Location: Right arm, Patient Position: Sitting, Cuff Size: Adult Regular)      History   Smoking Status     Never Smoker   Smokeless Tobacco     Never Used       Current Outpatient Medications   Medication Sig     Blood Pressure Monitor KIT Use to check BP twice daily     No current facility-administered medications for this visit.         He is having his blood pressure monitored because it has been moderately to severely elevated and has not been on medication.      Eriberto has had the following symptoms: none    We discussed Exercise, Stress management, Alcohol use and Diet and how it factors into hypertension.  Patient states that he used to be much more active and feels that he could lose some weight.  Patient states that he is  and has five children, and work has been stressful.  He works from home.  Also states that his spouse tends to use a lot of salt when cooking and he drinks alcohol.  States he does not consider himself to be an alcoholic, but he does drink on the weekends.  When he does drink, he doesn't \"see the point of drinking one or two, I will drink four or five\".  Dr. Hightower made aware of the readings and recommends treatment.  Patient wonders if medication can ever be stopped once started.  Informed patient that medication could be started and he could work on lifestyle modifications.  Once blood pressure has been under good control for a time, he could discuss with provider about trialing stopping a medication and monitor, then discuss with provider if needing to resume.  Appointment scheduled with Dr. Hightower on Tuesday to discuss treatment of hypertension.  Literature provided to patient about hypertension and lifestyle modifications to help control " it, DASH diet and how to properly take a blood pressure.    This service provided today was under the supervising provider of the day Dr. Hightower, who was available if needed.    Dalia Davis RN

## 2019-05-24 NOTE — PATIENT INSTRUCTIONS
Cryotherapy    What is it?    Use of a very cold liquid, such as liquid nitrogen, to freeze and destroy abnormal skin cells that need to be removed    What should I expect?    Tenderness and redness    A small blister that might grow and fill with dark purple blood. There may be crusting.    More than one treatment may be needed if the lesions do not go away.    How do I care for the treated area?    Gently wash the area with your hands when bathing.    Use a thin layer of Vaseline to help with healing. You may use a Band-Aid.     The area should heal within 7-10 days and may leave behind a pink or lighter color.     Do not use an antibiotic or Neosporin ointment.     You may take acetaminophen (Tylenol) for pain.     Call your Doctor if you have:    Severe pain    Signs of infection (warmth, redness, cloudy yellow drainage, and or a bad smell)    Questions or concerns    Who should I call with questions?       [facility]: [phone]       [facility]: [phone]       For urgent needs outside of business hours call the [facility] at [phone]        and ask for the dermatology resident on call

## 2019-05-31 ENCOUNTER — OFFICE VISIT (OUTPATIENT)
Dept: PEDIATRICS | Facility: CLINIC | Age: 49
End: 2019-05-31
Payer: COMMERCIAL

## 2019-05-31 VITALS
SYSTOLIC BLOOD PRESSURE: 134 MMHG | BODY MASS INDEX: 26.95 KG/M2 | HEART RATE: 55 BPM | WEIGHT: 187.8 LBS | OXYGEN SATURATION: 98 % | TEMPERATURE: 97.7 F | DIASTOLIC BLOOD PRESSURE: 92 MMHG

## 2019-05-31 DIAGNOSIS — Z13.6 CARDIOVASCULAR SCREENING; LDL GOAL LESS THAN 160: ICD-10-CM

## 2019-05-31 DIAGNOSIS — I10 BENIGN ESSENTIAL HYPERTENSION: Primary | ICD-10-CM

## 2019-05-31 PROCEDURE — 99213 OFFICE O/P EST LOW 20 MIN: CPT | Performed by: FAMILY MEDICINE

## 2019-05-31 RX ORDER — LISINOPRIL 20 MG/1
20 TABLET ORAL DAILY
Qty: 30 TABLET | Refills: 0 | Status: SHIPPED | OUTPATIENT
Start: 2019-05-31 | End: 2019-08-28

## 2019-05-31 NOTE — PATIENT INSTRUCTIONS
Start on LISINOPRIL 20 mg daily  Schedule for fasting labs, BP recheck in 2-3 weeks    Patient Education     Low-Salt Choices  Eating salt (sodium) can make your body retain too much water. Excess water makes your heart work harder. Canned, packaged, and frozen foods are easy to prepare. But they are often high in sodium. Here are some ideas for low-salt foods you can easily make yourself.    For breakfast    Fruit or 100% fruit juice    Whole-wheat bread or an English muffin. Look for sodium content on Nutrition Facts labels.    Low-fat milk or yogurt    Unsalted eggs    Shredded wheat    Corn tortillas    Unsalted steamed rice    Regular (not instant) hot cereal, made without salt  Stay away from:    Sausage, butts, and ham    Flour tortillas    Packaged muffins, pancakes, and biscuits    Instant hot cereals    Cottage cheese  For lunch and dinner    Fresh fish, chicken, turkey, or meat--baked, broiled, or roasted without salt    Dry beans, cooked without salt    Tofu, stir-fried without salt    Unsalted fresh fruit and vegetables, or frozen or canned fruit and vegetables with no added salt  Stay away from:    Lunch or deli meat that is cured or smoked    Cheese    Tomato juice and ketchup    Canned vegetables, soups, and fish not labeled as no-salt-added or reduced sodium    Packaged gravies and sauces    Olives, pickles, and relish    Bottled salad dressings  For snacks and desserts    Yogurt    Unsalted, air-popped popcorn    Unsalted nuts or seeds  Stay away from:    Pies and cakes    Packaged dessert mixes    Pizza    Canned and packaged puddings    Pretzels, chips, crackers, and nuts--unless the label says unsalted  Date Last Reviewed: 6/1/2017 2000-2018 [employer]. [address] [place] [address], [place] [address]. All rights reserved. This information is not intended as a substitute for professional medical care. Always follow your healthcare professional's instructions.           Patient  Education     Low-Salt Diet  This diet removes foods that are high in salt. It also limits the amount of salt you use when cooking. It is most often used for people with high blood pressure, edema (fluid retention), and kidney, liver, or heart disease.  Table salt contains the mineral sodium. Your body needs sodium to work normally. But too much sodium can make your health problems worse. Your healthcare provider is recommending a low-salt (also called low-sodium) diet for you. Your total daily allowance of salt is 1,500 to 2,300 milligrams (mg). It is less than 1 teaspoon of table salt. This means you can have only about 500 to 700 mg of sodium at each meal. People with certain health problems should limit salt intake to the lower end of the recommended range.    When you cook, don t add much salt. If you can cook without using salt, even better. Don t add salt to your food at the table.  When shopping, read food labels. Salt is often called sodium on the label. Choose foods that are salt-free, low salt, or very low salt. Note that foods with reduced salt may not lower your salt intake enough.    Beans, potatoes, and pasta  Ok: Dry beans, split peas, lentils, potatoes, rice, macaroni, pasta, spaghetti without added salt  Avoid: Potato chips, tortilla chips, and similar products  Breads and cereals  Ok: Low-sodium breads, rolls, cereals, and cakes; low-salt crackers, matzo crackers  Avoid: Salted crackers, pretzels, popcorn, Qatari toast, pancakes, muffins  Dairy  Ok: Milk, chocolate milk, hot chocolate mix, low-salt cheeses, and yogurt  Avoid: Processed cheese and cheese spreads; Roquefort, Camembert, and cottage cheese; buttermilk, instant breakfast drink  Desserts  Ok: Ice cream, frozen yogurt, juice bars, gelatin, cookies and pies, sugar, honey, jelly, hard candy  Avoid: Most pies, cakes and cookies prepared or processed with salt; instant pudding  Drinks  Ok: Tea, coffee, fizzy (carbonated) drinks,  juices  Avoid: Flavored coffees, electrolyte replacement drinks, sports drinks  Meats  Ok: All fresh meat, fish, poultry, low-salt tuna, eggs, egg substitute  Avoid: Smoked, pickled, brine-cured, or salted meats and fish. This includes butts, chipped beef, corned beef, hot dogs, deli meats, ham, kosher meats, salt pork, sausage, canned tuna, salted codfish, smoked salmon, herring, sardines, or anchovies.  Seasonings and spices  Ok: Most seasonings are okay. Good substitutes for salt include: fresh herb blends, hot sauce, lemon, garlic, gonzalez, vinegar, dry mustard, parsley, cilantro, horseradish, tomato paste, regular margarine, mayonnaise, unsalted butter, cream cheese, vegetable oil, cream, low-salt salad dressing and gravy.  Avoid: Regular ketchup, relishes, pickles, soy sauce, teriyaki sauce, Worcestershire sauce, BBQ sauce, tartar sauce, meat tenderizer, chili sauce, regular gravy, regular salad dressing, salted butter  Soups  Ok: Low-salt soups and broths made with allowed foods  Avoid: Bouillon cubes, soups with smoked or salted meats, regular soup and broth  Vegetables  Ok: Most vegetables are okay; also low-salt tomato and vegetable juices  Avoid: Sauerkraut and other brine-soaked vegetables; pickles and other pickled vegetables; tomato juice, olives  Date Last Reviewed: 8/1/2016 2000-2018 [employer]. [address] [place] [address]. All rights reserved. This information is not intended as a substitute for professional medical care. Always follow your healthcare professional's instructions.           Patient Education     Tips for Using Less Salt    Most people with heart problems need to eat less salt (sodium). Reducing the amount of salt you eat may help control your blood pressure. The higher your blood pressure, the greater your risk for heart disease, stroke, blindness, and kidney problems.  At the store    Make low-salt choices by reading labels carefully. Look for the total  amount of sodium per serving.    Use more fresh food. Buy more fruits and vegetables. Select lean meats, fish, and poultry.    Use fewer frozen, canned, and packaged foods which often contain a lot of sodium.    Use plain frozen vegetables without sauces or toppings. These products are often low-sodium or no-sodium.    Choose reduced-sodium or no-salt-added versions of canned vegetables and soups.  In the kitchen    Don't add salt to food when you're cooking. Season with flavorings such as onion, garlic, pepper, salt-free herbal blends, and lemon or lime juice.    Use a cookbook containing low-salt recipes. It can give you ideas for tasty meals that are healthy for your heart.    Sprinkle salt-free herbal blends on vegetables and meat.    Drain and rinse canned foods, such as canned beans and vegetables, before cooking or eating.  Eating out    Tell the  you're on a low-salt diet. Ask questions about the menu.    Order fish, chicken, and meat broiled, baked, poached, or grilled without salt, butter, or breading.    Use lemon, pepper, and salt-free herb mixes to add flavor.    Choose plain steamed rice, boiled noodles, and baked or boiled potatoes. Top potatoes with chives and a little sour cream.     Beware! Salt goes by many other names. Limit foods with these words listed as ingredients: salt, sodium, soy sauce, baking soda, baking powder, MSG, monosodium, Na (the chemical symbol for sodium). Some antacids are also high in salt.   Date Last Reviewed: 6/1/2017 2000-2018 [employer]. [address] [place] [address]. All rights reserved. This information is not intended as a substitute for professional medical care. Always follow your healthcare professional's instructions.           Patient Education     Using Herbs and Spices    Herbs and spices add flavor to cooking without adding fat or sodium. That's why they're great for healthy cooking. Try these tips to help create tasty,  healthy meals.  How much to use?  If a recipe calls for: 1 tablespoon of fresh herbs.you can use: 1 teaspoon of dried herbs, Or: 1/4 teaspoon of powdered herbs.   Tips for getting the most of herbs and spices    Use kitchen marlene or a sharp knife to cut leaves of fresh herbs. Cutting the leaves finely will release the most flavor.    When using whole spices, don't grind them until you need them. Crushing the berries and seeds with a mortar and pestle or grating whole nutmeg or cinnamon sticks just before adding them to your recipe will guarantee the most flavor.    Dried herbs pack more flavor for the same quantity than fresh herbs, and powdered herbs are more potent than dried flakes. If you are using powdered herbs in a recipe that calls for fresh, you'll want to decrease the amount you add.    When adding fresh or dried spices and herbs to cold recipes, such as dips or salad dressings, allow the food to sit in the refrigerator for at least a couple of hours before serving so the flavors can blend.    Add fresh spices and herbs to hot dishes as close to serving time as possible for the most flavorful results. Dried herbs and spices should be added early in the cooking process to prevent a powdery taste.    The longer dried herbs and spices sit in your cupboard without being used, the more flavor they lose. Whole spices last longer than ground or powdered spices. Store dried herbs and spices in a cool, dry, dark place. Keep powdered herbs and spices for no longer than a year.  Date Last Reviewed: 6/1/2017 2000-2018 [employer]. [address] [place] [address]. All rights reserved. This information is not intended as a substitute for professional medical care. Always follow your healthcare professional's instructions.

## 2019-05-31 NOTE — PROGRESS NOTES
Subjective     Eriberto Cordon is a 48 year old male who presents to clinic today for the following health issues:    HPI   Blood Pressure Follow-up    Patient is here for blood pressure recheck, he has been checking blood pressures at home which was significantly elevated   Denies chest pain, SOB, edema legs, visual concerns, focal neurological symptoms, dizziness, syncope, palpitations.        Do you check your blood pressure regularly outside of the clinic? No     Are you following a low salt diet? No    Are your blood pressures ever more than 140 on the top number (systolic) OR more   than 90 on the bottom number (diastolic), for example 140/90? Yes           Patient Active Problem List   Diagnosis     CARDIOVASCULAR SCREENING; LDL GOAL LESS THAN 160     Chronic serous otitis media of right ear     Elevated BP without diagnosis of hypertension     Plantar warts     Past Surgical History:   Procedure Laterality Date     C PE TUBE  2008    right side, several times     ENT SURGERY         Social History     Tobacco Use     Smoking status: Never Smoker     Smokeless tobacco: Never Used   Substance Use Topics     Alcohol use: Yes     Comment: Maybe once a week or twice     Family History   Problem Relation Age of Onset     Hypertension Father      Melanoma No family hx of      Skin Cancer No family hx of          Current Outpatient Medications   Medication Sig Dispense Refill     Blood Pressure Monitor KIT Use to check BP twice daily 1 kit 0     lisinopril (PRINIVIL/ZESTRIL) 20 MG tablet Take 1 tablet (20 mg) by mouth daily 30 tablet 0     No Known Allergies  Recent Labs   Lab Test 09/19/17  1423 12/04/12  1553 08/23/11  1230   * 99 104   HDL 55 50 55   TRIG 107 76 92   CR 0.99  --  1.09   GFRESTIMATED 82  --  75   GFRESTBLACK >90  --  >90   POTASSIUM 3.9  --  4.3      BP Readings from Last 3 Encounters:   05/31/19 (!) 134/92   05/24/19 (!) 146/98   05/01/19 145/88    Wt Readings from Last 3 Encounters:    05/31/19 85.2 kg (187 lb 12.8 oz)   05/01/19 86.2 kg (190 lb)   01/29/18 85.3 kg (188 lb)                      Reviewed and updated as needed this visit by Provider         Review of Systems   ROS COMP: CONSTITUTIONAL: NEGATIVE for fever, chills, change in weight  EYES: NEGATIVE for vision changes or irritation  RESP: NEGATIVE for significant cough or SOB  CV: NEGATIVE for chest pain, palpitations or peripheral edema  CV: as above  MUSCULOSKELETAL: NEGATIVE for significant arthralgias or myalgia  NEURO: NEGATIVE for weakness, dizziness or paresthesias  PSYCHIATRIC: NEGATIVE for changes in mood or affect      Objective    BP (!) 134/92   Pulse 55   Temp 97.7  F (36.5  C) (Oral)   Wt 85.2 kg (187 lb 12.8 oz)   SpO2 98%   BMI 26.95 kg/m    Body mass index is 26.95 kg/m .  Physical Exam   GENERAL: healthy, alert and no distress  RESP: lungs clear to auscultation - no rales, rhonchi or wheezes  CV: regular rate and rhythm, normal S1 S2, no S3 or S4, no murmur, click or rub, no peripheral edema and peripheral pulses strong  MS: no gross musculoskeletal defects noted, no edema  NEURO: Normal strength and tone, mentation intact and speech normal  PSYCH: mentation appears normal, affect normal/bright    Diagnostic Test Results:  Labs reviewed in Epic        Assessment & Plan     1. Benign essential hypertension  Patient had multiple blood pressure checks in the clinic including with his home monitoring device  Recommended to start on lisinopril 20 mg daily  We will follow-up for blood pressure recheck along with fasting labs at the time  Emphasized on low-salt diet, regular exercises, education handouts given to patient  Dosing and potential medication side effects discussed.  Patient verbalised understanding and is agreeable to the plan.    - lisinopril (PRINIVIL/ZESTRIL) 20 MG tablet; Take 1 tablet (20 mg) by mouth daily  Dispense: 30 tablet; Refill: 0     BMI:   Estimated body mass index is 26.95 kg/m  as  "calculated from the following:    Height as of 5/1/19: 1.778 m (5' 10\").    Weight as of this encounter: 85.2 kg (187 lb 12.8 oz).   Weight management plan: Discussed healthy diet and exercise guidelines        Work on weight loss  Regular exercise  Chart documentation done in part with Dragon Voice recognition Software. Although reviewed after completion, some word and grammatical error may remain.    See Patient Instructions    Return in about 3 weeks (around 6/21/2019) for Lab Work, Medication Recheck.    Andrews Hightower MD  [facility]      "

## 2019-06-04 ENCOUNTER — TELEPHONE (OUTPATIENT)
Dept: PEDIATRICS | Facility: CLINIC | Age: 49
End: 2019-06-04

## 2019-06-04 NOTE — TELEPHONE ENCOUNTER
Left message for patient to return clinic call regarding scheduling. Patient needs a Return  appointment for medication recheck with Andrews Hightower MD and fasting labs on or around 6/21/19. Number to clinic and Mychart option given, please assist in scheduling once patient returns clinic call.     Call Center OKAY TO SCHEDULE.    Thanks,   Rebecca Metz  Primary Care   [facility] [place]

## 2019-06-05 ENCOUNTER — OFFICE VISIT (OUTPATIENT)
Dept: DERMATOLOGY | Facility: CLINIC | Age: 49
End: 2019-06-05
Payer: COMMERCIAL

## 2019-06-05 DIAGNOSIS — B07.0 PLANTAR WARTS: Primary | ICD-10-CM

## 2019-06-05 PROCEDURE — 17110 DESTRUCTION B9 LES UP TO 14: CPT | Performed by: DERMATOLOGY

## 2019-06-05 ASSESSMENT — PAIN SCALES - GENERAL: PAINLEVEL: NO PAIN (0)

## 2019-06-05 NOTE — PROGRESS NOTES
[facility] Dermatology Note      Dermatology Problem List:  1. Verruca plantaris, s/p cryotherapy 5/3//2019, 5/24/2019, 6/5/2019    Encounter Date: Jun 5, 2019    CC:  Chief Complaint   Patient presents with     Wart     Eriberto is visiting to discuss left plantar foot         History of Present Illness:  Mr. Eriberto Cordon is a 48 year old male who presents today for wart follow up. He was last seen by Dr. Stewart 5/24/2019 when it was treated with cryotherapy. He reports it was not tender after the last treatment. He does not think a blister formed. Today, he is here for a follow up treatment. He treats with at home treatment with Mediplast. Leaves on almost continuously.      No other concerns addressed today.        Past Medical History:   Patient Active Problem List   Diagnosis     CARDIOVASCULAR SCREENING; LDL GOAL LESS THAN 160     Chronic serous otitis media of right ear     Elevated BP without diagnosis of hypertension     Plantar warts     Benign essential hypertension     Past Medical History:   Diagnosis Date     Benign essential hypertension 5/31/2019     Chronic serous otitis media of right ear     Treate with PE tubes as needed     Kidney stones      Past Surgical History:   Procedure Laterality Date     C PE TUBE  2008    right side, several times     ENT SURGERY         Social History:  Not assessed today.     Family History:  Negative for skin cancer.  Reviewed and left in chart for clinician convenience.     Medications:  Current Outpatient Medications   Medication Sig Dispense Refill     Blood Pressure Monitor KIT Use to check BP twice daily 1 kit 0     lisinopril (PRINIVIL/ZESTRIL) 20 MG tablet Take 1 tablet (20 mg) by mouth daily 30 tablet 0       No Known Allergies    Review of Systems:  -Constitutional: Patient is otherwise feeling well, in usual state of health.   -Skin: As above in HPI. No additional skin concerns.    Physical exam:  Vitals: There were no vitals taken for  this visit.  GEN: This is a well developed, well-nourished male in no acute distress, in a pleasant mood.    SKIN: Focused examination of the face and left foot was performed.  - Shah Type II  - 1 cm verrucous papule with thrombosed capillaries interrupting dermatoglyphics on the left plantar foot   - No other lesions of concern on areas examined.     Impression/Plan:  1. Verruca plantaris, left plantar foot. Will continue cryotherapy treatments.  This is treatment #3. Consider adding on candida injections if not steadily improving.   Cryotherapy procedure note: After verbal consent and discussion of risks and benefits including but no limited to dyspigmentation/scar, blister, and pain, 1 was treated with 1-2mm freeze border for 2 cycles with liquid nitrogen. Post cryotherapy instructions were provided.  Resume at home treatments as able.     Follow-up in 2 weeks for another round of cryo, earlier for new or changing lesions.       Staff Involved:  Scribe/Staff    Scribe Disclosure  I, Donna Cartwright, am serving as a scribe to document services personally performed by Dr. Bibi Lou MD, based on data collection and the provider's statements to me.     Provider Disclosure:   The documentation recorded by the scribe accurately reflects the services I personally performed and the decisions made by me.    Bibi Lou MD    Department of Dermatology  [facility]: Phone: [phone], Fax:[phone]  [facility]: Phone: [phone], Fax: [phone]

## 2019-06-05 NOTE — NURSING NOTE
Eriberto Cordon's goals for this visit include:   Chief Complaint   Patient presents with     Jesus Wei is visiting to discuss left plantar foot     He requests these members of his care team be copied on today's visit information:     PCP: Jasvir Garcia    Referring Provider:  No referring provider defined for this encounter.    There were no vitals taken for this visit.    Do you need any medication refills at today's visit? No

## 2019-06-19 ENCOUNTER — TELEPHONE (OUTPATIENT)
Dept: PEDIATRICS | Facility: CLINIC | Age: 49
End: 2019-06-19

## 2019-06-19 DIAGNOSIS — I10 BENIGN ESSENTIAL HYPERTENSION: ICD-10-CM

## 2019-06-19 RX ORDER — LISINOPRIL 20 MG/1
20 TABLET ORAL DAILY
Qty: 30 TABLET | Refills: 0 | Status: CANCELLED | OUTPATIENT
Start: 2019-06-19

## 2019-06-19 NOTE — TELEPHONE ENCOUNTER
[facility] Call Center    Phone Message    May a detailed message be left on voicemail: no    Reason for Call: Other: Pt had med check appt and lab on June 21st. with Dr Hightower.  He wanted me to cancel appt and just get a call back regarding a 'med check'.  [place] he did not need to come in to see doctor to discuss. Please call back.  Writer did not cancel the appt for lab and doctor yet in case he still needed to come in.      Action Taken: Message routed to:  Primary Care p [phone]

## 2019-06-19 NOTE — TELEPHONE ENCOUNTER
"Patient strongly speaks to writer stating he doesn't want to come back to recheck BP or have fasting labs done as recommended in 5/31 OV, but wants meds refilled for the summer. He \"has 5 kids and he's stressed out\" and wants to monitor his stress.  He would like a call back to inform him if refills can be sent.  Eveline Browning RN    "

## 2019-06-19 NOTE — TELEPHONE ENCOUNTER
Called patient, relayed message & patient verbalized understanding. Patient was upset and I spent 11 minutes on the phone educating, trying to explain our policy and asked that he not yell at writer.   He hasn't been taking it consistently and he 13 left. He will take it daily and then come into our pharmacy and lab.    Eveline Browning RN

## 2019-06-19 NOTE — TELEPHONE ENCOUNTER
Unfortunately we have to monitor his kidney functions and electrolytes after starting on lisinopril for hypertension and before continuing the medication further-unable to send refills until then  I would recommend to have his blood pressure recheck with one of the staff here  He can have the labs on the same day

## 2019-07-11 DIAGNOSIS — I10 BENIGN ESSENTIAL HYPERTENSION: ICD-10-CM

## 2019-07-11 RX ORDER — LISINOPRIL 20 MG/1
TABLET ORAL
Qty: 30 TABLET | Refills: 0 | OUTPATIENT
Start: 2019-07-11

## 2019-07-11 NOTE — TELEPHONE ENCOUNTER
Reviewed 6/27 encounter where we contacted patient multiple times and sent a letter and patient isn't currently scheduled. Sent message to pharmacy.   Eveline Browning RN

## 2019-08-28 ENCOUNTER — OFFICE VISIT (OUTPATIENT)
Dept: PEDIATRICS | Facility: CLINIC | Age: 49
End: 2019-08-28
Payer: COMMERCIAL

## 2019-08-28 VITALS
SYSTOLIC BLOOD PRESSURE: 160 MMHG | WEIGHT: 184.1 LBS | OXYGEN SATURATION: 98 % | HEART RATE: 68 BPM | DIASTOLIC BLOOD PRESSURE: 100 MMHG | BODY MASS INDEX: 26.42 KG/M2

## 2019-08-28 DIAGNOSIS — I10 BENIGN ESSENTIAL HYPERTENSION: ICD-10-CM

## 2019-08-28 PROBLEM — R03.0 ELEVATED BP WITHOUT DIAGNOSIS OF HYPERTENSION: Status: RESOLVED | Noted: 2019-05-01 | Resolved: 2019-08-28

## 2019-08-28 PROCEDURE — 99214 OFFICE O/P EST MOD 30 MIN: CPT | Performed by: INTERNAL MEDICINE

## 2019-08-28 RX ORDER — LISINOPRIL 20 MG/1
20 TABLET ORAL DAILY
Qty: 60 TABLET | Refills: 0 | Status: SHIPPED | OUTPATIENT
Start: 2019-08-28 | End: 2019-10-24

## 2019-08-28 NOTE — PROGRESS NOTES
Subjective     Eriberto Cordon is a 49 year old male who presents to clinic today for the following health issues:    HPI   Hypertension Follow-up      Do you check your blood pressure regularly outside of the clinic? Yes     Are you following a low salt diet? No    Are your blood pressures ever more than 140 on the top number (systolic) OR more   than 90 on the bottom number (diastolic), for example 140/90? 170's/100's      How many servings of fruits and vegetables do you eat daily?  4 or more sometimes     On average, how many sweetened beverages do you drink each day (soda, juice, sweet tea, etc)?  1 cup juice daily     How many days per week do you miss taking your medication? Pt is out of medication     49-year-old gentleman comes in regarding elevated blood pressure.  He was diagnosed with hypertension by Dr. Hightower and started on lisinopril 20 mg a day.  Patient was to follow-up with her in 1 month which she failed to do.  He ran out of the medication.  He has been checking his blood pressure at home and is been persistently elevated.  Systolic 160 range and diastolic 100 range.  He denies chest pain or shortness of breath.  No leg edema.  Family history of father with hypertension.  He is a non-smoker but use alcohol in excess amount.  He is probably drinking 10-15 drinks a week with combination of beer and hard liquor.  He does some exercise but not consistent.  He has 5 children and he claims he just runs around with them.  He does try to follow a low-salt diet.        Patient Active Problem List   Diagnosis     CARDIOVASCULAR SCREENING; LDL GOAL LESS THAN 160     Chronic serous otitis media of right ear     Elevated BP without diagnosis of hypertension     Plantar warts     Benign essential hypertension     Past Surgical History:   Procedure Laterality Date     C PE TUBE  2008    right side, several times     ENT SURGERY         Social History     Tobacco Use     Smoking status: Never Smoker     Smokeless  tobacco: Never Used   Substance Use Topics     Alcohol use: Yes     Alcohol/week: 6.0 oz     Types: 10 Standard drinks or equivalent per week     Comment: Maybe once a week or twice     Family History   Problem Relation Age of Onset     Hypertension Father      Melanoma No family hx of      Skin Cancer No family hx of          Current Outpatient Medications   Medication Sig Dispense Refill     lisinopril (PRINIVIL/ZESTRIL) 20 MG tablet Take 1 tablet (20 mg) by mouth daily 60 tablet 0     Blood Pressure Monitor KIT Use to check BP twice daily (Patient not taking: Reported on 8/28/2019) 1 kit 0     No Known Allergies  BP Readings from Last 3 Encounters:   08/28/19 (!) 156/109   05/31/19 (!) 134/92   05/24/19 (!) 146/98    Wt Readings from Last 3 Encounters:   08/28/19 83.5 kg (184 lb 1.6 oz)   05/31/19 85.2 kg (187 lb 12.8 oz)   05/01/19 86.2 kg (190 lb)                      Reviewed and updated as needed this visit by Provider         Review of Systems   ROS COMP: Constitutional, HEENT, cardiovascular, pulmonary, GI, , musculoskeletal, neuro, skin, endocrine and psych systems are negative, except as otherwise noted.      Objective    BP (!) 156/109 (BP Location: Right arm, Patient Position: Sitting, Cuff Size: Adult Regular)   Pulse 68   Wt 83.5 kg (184 lb 1.6 oz)   SpO2 98%   BMI 26.42 kg/m    Body mass index is 26.42 kg/m .  Physical Exam   GENERAL: healthy, alert and no distress  NECK: no adenopathy, no asymmetry, masses, or scars and thyroid normal to palpation  RESP: lungs clear to auscultation - no rales, rhonchi or wheezes  CV: regular rate and rhythm, normal S1 S2, no S3 or S4, no murmur, click or rub, no peripheral edema and peripheral pulses strong  ABDOMEN: soft, nontender, no hepatosplenomegaly, no masses and bowel sounds normal  MS: no gross musculoskeletal defects noted, no edema    Diagnostic Test Results:  Labs reviewed in Epic    He had a lab in 2017 which showed normal renal function and  "electrolytes.  Hemoglobin was normal at that time.  Fasting blood sugar was 89.    Assessment & Plan     1.  Stage II hypertension.  I discussed the importance of controlling blood pressure.  Complications of uncontrolled hypertension discussed in detail.  Low-salt diet discussed.  Regular exercise and reduction in his alcohol consumption discussed.  Advised to restart lisinopril 20 mg a day.  Informed patient that ideally in stage II hypertension the recommendation is to start 2 separate antihypertensive medication right away.  He expresses concern regarding antihypertensive medication.  Concerned about side effects.  I discussed in detail side effects of lisinopril particularly ACE induced cough and angioedema.  Advised to return for follow-up in 4 weeks with BMP.  In the meantime he needs to make lifestyle changes as mentioned above.  2.  Favorable lipid profile measured on 9/19/2017 with cholesterol 192 HDL 55 .  I will recheck his lipid profile when he returns in a month.     BMI:   Estimated body mass index is 26.42 kg/m  as calculated from the following:    Height as of 5/1/19: 1.778 m (5' 10\").    Weight as of this encounter: 83.5 kg (184 lb 1.6 oz).               No follow-ups on file.    Kaden Blanc MD  [facility]        "

## 2019-08-28 NOTE — NURSING NOTE
Eriberto Cordon's: BP check    PCP: Andrews Hightower    BP (!) 156/109 (BP Location: Right arm, Patient Position: Sitting, Cuff Size: Adult Regular)   Pulse 68   Wt 83.5 kg (184 lb 1.6 oz)   SpO2 98%   BMI 26.42 kg/m      Do you need any medication refills at today's visit? YES. Helen CMA

## 2019-10-24 DIAGNOSIS — I10 BENIGN ESSENTIAL HYPERTENSION: ICD-10-CM

## 2019-10-25 RX ORDER — LISINOPRIL 20 MG/1
TABLET ORAL
Qty: 30 TABLET | Refills: 0 | Status: SHIPPED | OUTPATIENT
Start: 2019-10-25 | End: 2019-11-21

## 2019-10-25 NOTE — TELEPHONE ENCOUNTER
lisinopril (PRINIVIL/ZESTRIL) 20 MG tablet  Last Written Prescription Date:  8/28/19  Last Fill Quantity: 60,  # refills: 0   Last office visit: 8/28/2019 with prescribing provider   Future Office Visit:  N/A - pt was advised to follow up in one month, no showed 9/23/19 appointment    Routing refill request to provider for review/approval because:    ACE Inhibitors (Including Combos) Protocol [id number] 5:03 AM   Blood pressure under 140/90 in past 12 months    Normal serum creatinine on file in past 12 months    Normal serum potassium on file in past 12 months       Dalia Davis RN

## 2019-10-25 NOTE — TELEPHONE ENCOUNTER
Dr Blanc sent 30 day refill and requested outreach to patient to schedule followup appointment.      Spoke with patient who declined to schedule. Provider notified.      Rebecca Metz  Primary Care   [facility] [place]

## 2019-12-15 ENCOUNTER — HEALTH MAINTENANCE LETTER (OUTPATIENT)
Age: 49
End: 2019-12-15

## 2019-12-16 ENCOUNTER — IMMUNIZATION (OUTPATIENT)
Dept: FAMILY MEDICINE | Facility: CLINIC | Age: 49
End: 2019-12-16
Payer: COMMERCIAL

## 2019-12-16 VITALS — SYSTOLIC BLOOD PRESSURE: 146 MMHG | DIASTOLIC BLOOD PRESSURE: 84 MMHG

## 2019-12-16 DIAGNOSIS — Z01.30 BLOOD PRESSURE CHECK: ICD-10-CM

## 2019-12-16 DIAGNOSIS — Z23 NEED FOR PROPHYLACTIC VACCINATION AND INOCULATION AGAINST INFLUENZA: Primary | ICD-10-CM

## 2019-12-16 PROCEDURE — 90686 IIV4 VACC NO PRSV 0.5 ML IM: CPT

## 2019-12-16 PROCEDURE — 99207 ZZC NO CHARGE NURSE ONLY: CPT

## 2019-12-16 PROCEDURE — 90471 IMMUNIZATION ADMIN: CPT

## 2019-12-16 NOTE — PROGRESS NOTES
Eriberto Cordon is a 49 year old patient who comes in today for a Blood Pressure check.  Initial BP:  BP (!) 146/84 (BP Location: Left arm, Patient Position: Sitting, Cuff Size: Adult Large)      Data Unavailable  Disposition: results routed to provider    Patient was here for flu shot wanted a BP check.     Raymundo Bowman MA on 12/16/2019 at 4:42 PM

## 2020-07-16 DIAGNOSIS — I10 BENIGN ESSENTIAL HYPERTENSION: ICD-10-CM

## 2020-07-20 RX ORDER — LISINOPRIL 20 MG/1
20 TABLET ORAL DAILY
Qty: 90 TABLET | Refills: 0 | Status: SHIPPED | OUTPATIENT
Start: 2020-07-20 | End: 2020-10-26

## 2020-07-20 NOTE — TELEPHONE ENCOUNTER
Last Clinic Visit: 8/28/2019  [facility]    Lisinopril: BP above protocol parameters - refill sent for 90 days supply and FYI to provider.    *future lab orders pending in chart.

## 2020-07-21 NOTE — TELEPHONE ENCOUNTER
Attempt # 1    Left message for patient stating prescription has been sent to the pharmacy. Advised patient to call clinic to schedule a annual med check/Physical appointment with Kaden Blanc MD around 8/29/20. Clinic number and Mychart option given.    Rebecca Metz  Primary Care   [facility] [place]

## 2020-07-28 NOTE — TELEPHONE ENCOUNTER
Spoke with patient to schedule followup with Dr Blanc and fasting labs.  Patient states he has been feeling better and not taking it much. He is learning to not stress about the kids yelling.  He is picking up this refill so he has it on hand, but he is going to monitor his bp at home.  Patient informed he needs to be seen by provider at least once a year for continued refills.  Patient know to schedule before he runs out.    Rebecca Metz  Primary Care   [facility] [place]

## 2020-10-22 DIAGNOSIS — I10 BENIGN ESSENTIAL HYPERTENSION: ICD-10-CM

## 2020-10-26 RX ORDER — LISINOPRIL 20 MG/1
20 TABLET ORAL DAILY
Qty: 30 TABLET | Refills: 0 | Status: SHIPPED | OUTPATIENT
Start: 2020-10-26 | End: 2021-09-13

## 2020-10-26 NOTE — TELEPHONE ENCOUNTER
Last Clinic Visit: 8/28/19, no upcoming appointments.  Received notification to schedule annual appointment in July.  30 day refill provided per protocol, routed to clinic for follow up.  Overdue for labs, last BP elevated, addressed by provider at that visit

## 2020-10-27 NOTE — TELEPHONE ENCOUNTER
Patient declined to schedule.  Informed him I'm not sure how many refills of this medication we can give since he was last seen in August 2019.    Rebecca Metz  Primary Care   [facility] [place]

## 2020-11-15 DIAGNOSIS — I10 BENIGN ESSENTIAL HYPERTENSION: ICD-10-CM

## 2020-11-17 NOTE — TELEPHONE ENCOUNTER
lisinopril (ZESTRIL) 20 MG  Last Written Prescription Date: 10/22/20  Last Fill Quantity: 30,   # refills: 0  Last Office Visit : 8/29/19  Future Office visit:  NONE  Routing refill request to provider for review/approval because: PT STATUS ?   Does PCP want to continue med / RFs ?   Please see last RF chart note 10/22/20  Pt declines RTC appts. + 0VER DUE LAB 2017

## 2020-11-18 NOTE — TELEPHONE ENCOUNTER
I have not seen this patient since June 2019  He has not followed up with Dr. Blanc as he was advised  Unable to refill  Please inform patient to schedule with Dr. Blanc for follow-up   eye foreign body

## 2020-11-18 NOTE — TELEPHONE ENCOUNTER
Routing to provider to please advise    Jael Cooney RN  [facility]

## 2020-11-19 RX ORDER — LISINOPRIL 20 MG/1
20 TABLET ORAL DAILY
Qty: 90 TABLET | Refills: 0 | OUTPATIENT
Start: 2020-11-19

## 2020-11-19 NOTE — TELEPHONE ENCOUNTER
Request denied with note to pharmacy appointment is needed.  Patient has been notified in the past of the need to schedule and declined.    Dalia Davis RN

## 2021-01-15 ENCOUNTER — HEALTH MAINTENANCE LETTER (OUTPATIENT)
Age: 51
End: 2021-01-15

## 2021-05-05 ENCOUNTER — TELEPHONE (OUTPATIENT)
Dept: FAMILY MEDICINE | Facility: CLINIC | Age: 51
End: 2021-05-05

## 2021-05-05 NOTE — TELEPHONE ENCOUNTER
Patient Quality Outreach      Summary:    Patient has the following on his problem list/HM:     Hypertension   Last three blood pressure readings:  BP Readings from Last 3 Encounters:   12/16/19 (!) 146/84   08/28/19 (!) 160/100   05/31/19 (!) 134/92     Blood pressure: Failed    HTN Guidelines:  ? 139/89     Patient is due/failing the following:   BP check    Type of outreach:    [employer] message.    Questions for provider review:    None                                                                                                                                     Ida Smith

## 2021-09-10 NOTE — PROGRESS NOTES
"    Assessment & Plan     Benign essential hypertension  Pt has poorly controlled HTN.   Reviewed prior flowsheet of VS in epic. Many readings showing stage 2 HTN.   He ahs been seen in past by  and  to start medications, but appears he did not return after either visit for recheck to assess efficacy.   He thinks he tolerated lisinopril well but he also did not take consistently.  Discussed importance of taking every day, consistently.   Discussed risks of untreated HTN.   Update labs today (nonfasting)  Restart lisinopril.   Discussed importance of follow up in 2 weeks to recheck BP on his medication. Discussed he may need dose increase or addition of 2nd medication.   - lisinopril (ZESTRIL) 20 MG tablet; Take 1 tablet (20 mg) by mouth daily  - Basic metabolic panel  (Ca, Cl, CO2, Creat, Gluc, K, Na, BUN); Future  - Albumin Random Urine Quantitative with Creat Ratio; Future  - TSH with free T4 reflex; Future  - CBC with platelets; Future  - Basic metabolic panel  (Ca, Cl, CO2, Creat, Gluc, K, Na, BUN)  - Albumin Random Urine Quantitative with Creat Ratio  - TSH with free T4 reflex  - CBC with platelets    Muscle strain of upper back  Strain of rhomboids and trapezius from lifting weights.   Ice, heat, stretch.  Muscle relaxer at bedtime. Discussed precautions and in AVS.   - cyclobenzaprine (FLEXERIL) 10 MG tablet; Take 1 tablet (10 mg) by mouth At Bedtime    Screening for hyperlipidemia  Nonfasting.   - Lipid panel reflex to direct LDL Non-fasting; Future  - Lipid panel reflex to direct LDL Non-fasting  6}     BMI:   Estimated body mass index is 26.54 kg/m  as calculated from the following:    Height as of this encounter: 1.778 m (5' 10\").    Weight as of this encounter: 83.9 kg (185 lb).       Follow Up: The patient was instructed to contact clinic for worsening symptoms, non-improvement in time frame as expected/discussed, and for questions regarding medications or treatment plan. For virtual " "visits, the patient was advised to be seen for in person evaluation if symptoms or condition are worsening or non-improvement as expected.       Return in about 2 weeks (around 9/27/2021) for BP Recheck visit with labs .    PRIYANKA Geronimo [facility] YFN Wei is a 51 year old who presents for the following health issues  accompanied by his self:    History of Present Illness       Back Pain:  He presents for follow up of back pain. Patient's back pain is a recurring problem.  Location of back pain:  Right upper back, right side of neck and right shoulder  Description of back pain: cramping and dull ache  Back pain spreads: right shoulder    Since patient first noticed back pain, pain is: gradually improving  Does back pain interfere with his job:  No      Hypertension: He presents for follow up of hypertension.  He does not check blood pressure  regularly outside of the clinic. Outside blood pressures have been over 140/90. He does not follow a low salt diet.     He eats 2-3 servings of fruits and vegetables daily.He consumes 1 sweetened beverage(s) daily.He exercises with enough effort to increase his heart rate 20 to 29 minutes per day.  He exercises with enough effort to increase his heart rate 3 or less days per week. He is missing 7 dose(s) of medications per week.     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   He was diagnosed w/HTN a few years ago. He was last prescribed lisinopril 20mg in Oct 2020. He took it \"sporadically\", not daily, took  \"how ever long the rx lasted\".   Nonsmoker  Alcohol- 2 days per week- 3-5 at a time.   Exercise- aerobics little bit. Lifting weights. No CP, SOB, leg edema, headaches.   Does not follow a low sodium diet.     Right upper back/shoulder " "pain-  Has been seen for this in the past. Given steroids.. it resolved in a few weeks.  He lifts weights. He changed technique with a row type mechanism and seemed to start after that.   He also thinks his clavicle \"dislocates\" from his sternum.     Review of Systems   Constitutional, HEENT, cardiovascular, pulmonary, gi and gu systems are negative, except as otherwise noted.      Objective    BP (!) 148/100   Pulse 71   Temp 98.3  F (36.8  C) (Temporal)   Resp 18   Ht 1.778 m (5' 10\")   Wt 83.9 kg (185 lb)   SpO2 98%   BMI 26.54 kg/m    Body mass index is 26.54 kg/m .  Physical Exam   GENERAL: healthy, alert and no distress  EYES: Eyes grossly normal to inspection, PERRL and conjunctivae and sclerae normal  NECK: no adenopathy, no asymmetry, masses, or scars and thyroid normal to palpation  RESP: lungs clear to auscultation - no rales, rhonchi or wheezes  CV: regular rate and rhythm, normal S1 S2, no S3 or S4, no murmur, click or rub, no peripheral edema and peripheral pulses strong  ABDOMEN: soft, nontender, no hepatosplenomegaly, no masses and bowel sounds normal  MS: Thoracic upper back/cpsine/Shoulder: RIGHT: no obvious deformity, bruising, skin change. Tender to palpation right superior trapezius and over rhomboids between right scapula edge and Tspine. No focal pain of clavicle, AC joint, biceps tendon, scapula or ant shoulder. ROM full of Cspine and shoulder.No discomfort with Neers and Apple. Neg/normal empty can testing.  5/5. Sensation intact to light touch distally. Radial pulses symmetric.  C-spine: no midline or paracervical tenderness. normal ROM.  NEURO: Normal strength and tone, mentation intact and speech normal  PSYCH: mentation appears normal, affect anxious    Results for orders placed or performed in visit on 09/13/21 (from the past 24 hour(s))   Basic metabolic panel  (Ca, Cl, CO2, Creat, Gluc, K, Na, BUN)   Result Value Ref Range    Sodium 139 133 - 144 mmol/L    Potassium 3.9 " 3.4 - 5.3 mmol/L    Chloride 107 94 - 109 mmol/L    Carbon Dioxide (CO2) 29 20 - 32 mmol/L    Anion Gap 3 3 - 14 mmol/L    Urea Nitrogen 15 7 - 30 mg/dL    Creatinine 1.09 0.66 - 1.25 mg/dL    Calcium 8.9 8.5 - 10.1 mg/dL    Glucose 84 70 - 99 mg/dL    GFR Estimate 79 >60 mL/min/1.73m2   TSH with free T4 reflex   Result Value Ref Range    TSH 1.93 0.40 - 4.00 mU/L   CBC with platelets   Result Value Ref Range    WBC Count 9.7 4.0 - 11.0 10e3/uL    RBC Count 5.28 4.40 - 5.90 10e6/uL    Hemoglobin 15.6 13.3 - 17.7 g/dL    Hematocrit 46.7 40.0 - 53.0 %    MCV 88 78 - 100 fL    MCH 29.5 26.5 - 33.0 pg    MCHC 33.4 31.5 - 36.5 g/dL    RDW 13.0 10.0 - 15.0 %    Platelet Count 268 150 - 450 10e3/uL   Lipid panel reflex to direct LDL Non-fasting   Result Value Ref Range    Cholesterol 199 <200 mg/dL    Triglycerides 223 (H) <150 mg/dL    Direct Measure HDL 54 >=40 mg/dL    LDL Cholesterol Calculated 100 <=100 mg/dL    Non HDL Cholesterol 145 (H) <130 mg/dL    Patient Fasting > 8hrs? Unknown    Albumin Random Urine Quantitative with Creat Ratio   Result Value Ref Range    Creatinine Urine mg/dL 41 mg/dL    Albumin Urine mg/L 5 mg/L    Albumin Urine mg/g Cr 12.20 0.00 - 17.00 mg/g Cr

## 2021-09-13 ENCOUNTER — OFFICE VISIT (OUTPATIENT)
Dept: FAMILY MEDICINE | Facility: CLINIC | Age: 51
End: 2021-09-13

## 2021-09-13 VITALS
WEIGHT: 185 LBS | TEMPERATURE: 98.3 F | HEART RATE: 71 BPM | HEIGHT: 70 IN | RESPIRATION RATE: 18 BRPM | BODY MASS INDEX: 26.48 KG/M2 | DIASTOLIC BLOOD PRESSURE: 100 MMHG | SYSTOLIC BLOOD PRESSURE: 148 MMHG | OXYGEN SATURATION: 98 %

## 2021-09-13 DIAGNOSIS — S29.012A MUSCLE STRAIN OF UPPER BACK: ICD-10-CM

## 2021-09-13 DIAGNOSIS — I10 BENIGN ESSENTIAL HYPERTENSION: Primary | ICD-10-CM

## 2021-09-13 DIAGNOSIS — Z13.220 SCREENING FOR HYPERLIPIDEMIA: ICD-10-CM

## 2021-09-13 LAB
ANION GAP SERPL CALCULATED.3IONS-SCNC: 3 MMOL/L (ref 3–14)
BUN SERPL-MCNC: 15 MG/DL (ref 7–30)
CALCIUM SERPL-MCNC: 8.9 MG/DL (ref 8.5–10.1)
CHLORIDE BLD-SCNC: 107 MMOL/L (ref 94–109)
CHOLEST SERPL-MCNC: 199 MG/DL
CO2 SERPL-SCNC: 29 MMOL/L (ref 20–32)
CREAT SERPL-MCNC: 1.09 MG/DL (ref 0.66–1.25)
CREAT UR-MCNC: 41 MG/DL
ERYTHROCYTE [DISTWIDTH] IN BLOOD BY AUTOMATED COUNT: 13 % (ref 10–15)
FASTING STATUS PATIENT QL REPORTED: ABNORMAL
GFR SERPL CREATININE-BSD FRML MDRD: 79 ML/MIN/1.73M2
GLUCOSE BLD-MCNC: 84 MG/DL (ref 70–99)
HCT VFR BLD AUTO: 46.7 % (ref 40–53)
HDLC SERPL-MCNC: 54 MG/DL
HGB BLD-MCNC: 15.6 G/DL (ref 13.3–17.7)
LDLC SERPL CALC-MCNC: 100 MG/DL
MCH RBC QN AUTO: 29.5 PG (ref 26.5–33)
MCHC RBC AUTO-ENTMCNC: 33.4 G/DL (ref 31.5–36.5)
MCV RBC AUTO: 88 FL (ref 78–100)
MICROALBUMIN UR-MCNC: 5 MG/L
MICROALBUMIN/CREAT UR: 12.2 MG/G CR (ref 0–17)
NONHDLC SERPL-MCNC: 145 MG/DL
PLATELET # BLD AUTO: 268 10E3/UL (ref 150–450)
POTASSIUM BLD-SCNC: 3.9 MMOL/L (ref 3.4–5.3)
RBC # BLD AUTO: 5.28 10E6/UL (ref 4.4–5.9)
SODIUM SERPL-SCNC: 139 MMOL/L (ref 133–144)
TRIGL SERPL-MCNC: 223 MG/DL
TSH SERPL DL<=0.005 MIU/L-ACNC: 1.93 MU/L (ref 0.4–4)
WBC # BLD AUTO: 9.7 10E3/UL (ref 4–11)

## 2021-09-13 PROCEDURE — 36415 COLL VENOUS BLD VENIPUNCTURE: CPT | Performed by: PHYSICIAN ASSISTANT

## 2021-09-13 PROCEDURE — 82043 UR ALBUMIN QUANTITATIVE: CPT | Performed by: PHYSICIAN ASSISTANT

## 2021-09-13 PROCEDURE — 84443 ASSAY THYROID STIM HORMONE: CPT | Performed by: PHYSICIAN ASSISTANT

## 2021-09-13 PROCEDURE — 80061 LIPID PANEL: CPT | Performed by: PHYSICIAN ASSISTANT

## 2021-09-13 PROCEDURE — 85027 COMPLETE CBC AUTOMATED: CPT | Performed by: PHYSICIAN ASSISTANT

## 2021-09-13 PROCEDURE — 80048 BASIC METABOLIC PNL TOTAL CA: CPT | Performed by: PHYSICIAN ASSISTANT

## 2021-09-13 PROCEDURE — 99214 OFFICE O/P EST MOD 30 MIN: CPT | Performed by: PHYSICIAN ASSISTANT

## 2021-09-13 RX ORDER — LISINOPRIL 20 MG/1
20 TABLET ORAL DAILY
Qty: 30 TABLET | Refills: 0 | Status: SHIPPED | OUTPATIENT
Start: 2021-09-13 | End: 2021-09-27

## 2021-09-13 RX ORDER — CYCLOBENZAPRINE HCL 10 MG
10 TABLET ORAL AT BEDTIME
Qty: 14 TABLET | Refills: 0 | Status: SHIPPED | OUTPATIENT
Start: 2021-09-13 | End: 2023-01-02

## 2021-09-13 ASSESSMENT — PAIN SCALES - GENERAL: PAINLEVEL: MODERATE PAIN (4)

## 2021-09-13 ASSESSMENT — MIFFLIN-ST. JEOR: SCORE: 1705.4

## 2021-09-13 NOTE — PATIENT INSTRUCTIONS
Start the lisinopril. Take daily, every day.   Recheck in 2 weeks at visit     You were prescribed a muscle relaxer. This can make you dizzy and/or drowsy.   Do NOT drink alcohol while taking.   Try for the first time at home (ie before bed) so you know how it affects you.   Do not drive while taking if you feel dizzy or drowsy.

## 2021-09-27 ENCOUNTER — OFFICE VISIT (OUTPATIENT)
Dept: FAMILY MEDICINE | Facility: CLINIC | Age: 51
End: 2021-09-27

## 2021-09-27 VITALS
HEART RATE: 83 BPM | RESPIRATION RATE: 16 BRPM | DIASTOLIC BLOOD PRESSURE: 86 MMHG | WEIGHT: 184.8 LBS | TEMPERATURE: 97.8 F | OXYGEN SATURATION: 96 % | BODY MASS INDEX: 26.45 KG/M2 | SYSTOLIC BLOOD PRESSURE: 126 MMHG | HEIGHT: 70 IN

## 2021-09-27 DIAGNOSIS — Z12.11 SCREEN FOR COLON CANCER: ICD-10-CM

## 2021-09-27 DIAGNOSIS — Z23 HIGH PRIORITY FOR 2019-NCOV VACCINE: ICD-10-CM

## 2021-09-27 DIAGNOSIS — Z23 NEED FOR PROPHYLACTIC VACCINATION AND INOCULATION AGAINST INFLUENZA: ICD-10-CM

## 2021-09-27 DIAGNOSIS — S29.012A MUSCLE STRAIN OF UPPER BACK: ICD-10-CM

## 2021-09-27 DIAGNOSIS — I10 BENIGN ESSENTIAL HYPERTENSION: Primary | ICD-10-CM

## 2021-09-27 DIAGNOSIS — Q74.0 ABNORMAL PROMINENCE OF CLAVICLE: ICD-10-CM

## 2021-09-27 LAB
ANION GAP SERPL CALCULATED.3IONS-SCNC: 6 MMOL/L (ref 3–14)
BUN SERPL-MCNC: 16 MG/DL (ref 7–30)
CALCIUM SERPL-MCNC: 9.3 MG/DL (ref 8.5–10.1)
CHLORIDE BLD-SCNC: 106 MMOL/L (ref 94–109)
CO2 SERPL-SCNC: 29 MMOL/L (ref 20–32)
CREAT SERPL-MCNC: 1.02 MG/DL (ref 0.66–1.25)
GFR SERPL CREATININE-BSD FRML MDRD: 85 ML/MIN/1.73M2
GLUCOSE BLD-MCNC: 122 MG/DL (ref 70–99)
POTASSIUM BLD-SCNC: 3.9 MMOL/L (ref 3.4–5.3)
SODIUM SERPL-SCNC: 141 MMOL/L (ref 133–144)

## 2021-09-27 PROCEDURE — 99214 OFFICE O/P EST MOD 30 MIN: CPT | Performed by: PHYSICIAN ASSISTANT

## 2021-09-27 PROCEDURE — 80048 BASIC METABOLIC PNL TOTAL CA: CPT | Performed by: PHYSICIAN ASSISTANT

## 2021-09-27 PROCEDURE — 36415 COLL VENOUS BLD VENIPUNCTURE: CPT | Performed by: PHYSICIAN ASSISTANT

## 2021-09-27 RX ORDER — LISINOPRIL 20 MG/1
20 TABLET ORAL DAILY
Qty: 90 TABLET | Refills: 3 | Status: SHIPPED | OUTPATIENT
Start: 2021-09-27 | End: 2022-10-17

## 2021-09-27 ASSESSMENT — PAIN SCALES - GENERAL: PAINLEVEL: MILD PAIN (3)

## 2021-09-27 ASSESSMENT — MIFFLIN-ST. JEOR: SCORE: 1704.5

## 2021-09-27 NOTE — PATIENT INSTRUCTIONS
Your blood pressure is MUCH better!   Refilled for the year    Continue on the lisinopril 20mg daily.   Recheck lab today     Physical Therapy  [facility]  [phone]

## 2021-09-27 NOTE — PROGRESS NOTES
"Assessment & Plan     Benign essential hypertension  BP is at goal on his lisinopril 20mg daily.   Refilled x1 yr  Update BMP today after restarting his medication  Continue heart healthy lifestyle habits   - lisinopril (ZESTRIL) 20 MG tablet; Take 1 tablet (20 mg) by mouth daily  - Basic metabolic panel  (Ca, Cl, CO2, Creat, Gluc, K, Na, BUN); Future  - Basic metabolic panel  (Ca, Cl, CO2, Creat, Gluc, K, Na, BUN)    Muscle strain of upper back  Improvement in sx   Muscle relaxer helped- he only took 1 dose.   Ref to PT due to persisting sx.   He is concerned prominence of clavicle could be causing this- do not think related.   - [facility]; Future    Abnormal prominence of clavicle  Discussed imaging/xray. Likely arthritis type changes. I do not think this is \"dislocated\".   He would like to hold off on xray for now.        Follow Up: The patient was instructed to contact clinic for worsening symptoms, non-improvement in time frame as expected/discussed, and for questions regarding medications or treatment plan. For virtual visits, the patient was advised to be seen for in person evaluation if symptoms or condition are worsening or non-improvement as expected.       Return in about 4 weeks (around 10/25/2021) for if muscle pain/strain not improving w/PT .    PRIYANKA Geronimo [facility] YFN Cordon is a 51 year old male who presents to clinic today for the following health issues     History of Present Illness       Back Pain:  He presents for follow up of back pain. Patient's back pain is a recurring problem.  Location of back pain:  Right upper back  Description of back pain: cramping, dull ache and gnawing  Back pain spreads: right side of neck    Since patient first noticed back pain, pain is: gradually improving  Does back pain interfere with his job:  No      Hypertension: He presents for follow up of hypertension.  He does check blood pressure  " "regularly outside of the clinic. Outside blood pressures have been over 140/90. He follows a low salt diet.     He eats 0-1 servings of fruits and vegetables daily.He consumes 1 sweetened beverage(s) daily.He exercises with enough effort to increase his heart rate 10 to 19 minutes per day.  He exercises with enough effort to increase his heart rate 3 or less days per week.   He is taking medications regularly.     Hypertension-   Last visit restarted his lisinopril.  He has been taking it daily.  No side effects  Feels more relaxed.   His BP here 126/86 on repeat 128/80.   Home readings- taking in the morning- on his home machine running higher than in clinic.     Muscle strain-   Area of pain right sided upper thoracic between scapula and shoulder blade. Started after lifting weights.   Better.   Massage helps  He thinks his ergonomics of work station also are aggravating  He has a prominence to the right clavicle at the sternum. He thinks it is dislocated. He had someone tell him that he should find a chiropractor to \"put it back in\". He is concerned this is the source of his upper thoracic area pain.         Review of Systems   Constitutional, HEENT, cardiovascular, pulmonary, gi and gu systems are negative, except as otherwise noted.      Objective    /86   Pulse 83   Temp 97.8  F (36.6  C) (Temporal)   Resp 16   Ht 1.778 m (5' 10\")   Wt 83.8 kg (184 lb 12.8 oz)   SpO2 96%   BMI 26.52 kg/m    Body mass index is 26.52 kg/m .  Physical Exam   GENERAL: healthy, alert and no distress  EYES: Eyes grossly normal to inspection, PERRL and conjunctivae and sclerae normal  NECK: no adenopathy, no asymmetry, masses, or scars and thyroid normal to palpation  RESP: lungs clear to auscultation - no rales, rhonchi or wheezes  CV: regular rate and rhythm, normal S1 S2, no S3 or S4, no murmur, click or rub, no peripheral edema and peripheral pulses strong  MS: RIGHT clavicle: prominence at right sternoclavicular " joint slightly more pronounced than left side. Nontender.   Right thoracic: no obvious deformity, bruising, skin change. Tender to palpation right rhomboids between right scapula edge and Tspine. Normal neck and shoulder ROM.     No results found for this or any previous visit (from the past 24 hour(s)).

## 2021-10-24 ENCOUNTER — HEALTH MAINTENANCE LETTER (OUTPATIENT)
Age: 51
End: 2021-10-24

## 2022-02-13 ENCOUNTER — HEALTH MAINTENANCE LETTER (OUTPATIENT)
Age: 52
End: 2022-02-13

## 2022-10-12 DIAGNOSIS — I10 BENIGN ESSENTIAL HYPERTENSION: ICD-10-CM

## 2022-10-15 ENCOUNTER — HEALTH MAINTENANCE LETTER (OUTPATIENT)
Age: 52
End: 2022-10-15

## 2022-10-17 RX ORDER — LISINOPRIL 20 MG/1
TABLET ORAL
Qty: 30 TABLET | Refills: 0 | Status: SHIPPED | OUTPATIENT
Start: 2022-10-17 | End: 2022-11-23

## 2022-10-17 NOTE — TELEPHONE ENCOUNTER
Due for annual visit. Last visit 09/2021.   Filled 30d only  Please schedule pt in person, needs labs , BP and exam  Chary Lopez PA-C

## 2022-10-17 NOTE — TELEPHONE ENCOUNTER
"Pending Prescriptions:                       Disp   Refills    lisinopril (ZESTRIL) 20 MG tablet [Pharmac*90 tab*3        Sig: TAKE 1 TABLET BY MOUTH EVERY DAY        Routing refill request to provider for review/approval because:    Inhibitors (Including Combos) Protocol Failed    Rerun Protocol (10/12/2022 3:07 AM)    Blood pressure under 140/90 in past 12 months      BP Readings from Last 3 Encounters:   09/27/21 126/86   09/13/21 (!) 148/100   12/16/19 (!) 146/84           Recent (12 mo) or future (30 days) visit within the authorizing provider's specialty  Patient has had an office visit with the authorizing provider or a provider within the authorizing providers department within the previous 12 mos or has a future within next 30 days. See \"Patient Info\" tab in inbasket, or \"Choose Columns\" in Meds & Orders section of the refill encounter.         Normal serum creatinine on file in past 12 months      Recent Labs   Lab Test 09/27/21  1502   CR 1.02      Ok to refill medication if creatinine is low    Normal serum potassium on file in past 12 months      Recent Labs   Lab Test 09/27/21  1502   POTASSIUM 3.9       Medication is active on med list      Patient is age 18 or older        "

## 2022-11-19 DIAGNOSIS — I10 BENIGN ESSENTIAL HYPERTENSION: ICD-10-CM

## 2022-11-22 NOTE — TELEPHONE ENCOUNTER
"Pending Prescriptions:                       Disp   Refills    lisinopril (ZESTRIL) 20 MG tablet [Pharmac*30 tab*0        Sig: TAKE 1 TABLET BY MOUTH EVERY DAY    Routing refill request to provider for review/approval because:  Labs not current:  BP, creatinine and potassium    Requested Prescriptions   Pending Prescriptions Disp Refills    lisinopril (ZESTRIL) 20 MG tablet [Pharmacy Med Name: LISINOPRIL 20MG TABLETS] 30 tablet 0     Sig: TAKE 1 TABLET BY MOUTH EVERY DAY       ACE Inhibitors (Including Combos) Protocol Failed - 11/19/2022 12:56 PM        Failed - Blood pressure under 140/90 in past 12 months     BP Readings from Last 3 Encounters:   09/27/21 126/86   09/13/21 (!) 148/100   12/16/19 (!) 146/84                 Failed - Recent (12 mo) or future (30 days) visit within the authorizing provider's specialty     Patient has had an office visit with the authorizing provider or a provider within the authorizing providers department within the previous 12 mos or has a future within next 30 days. See \"Patient Info\" tab in inbasket, or \"Choose Columns\" in Meds & Orders section of the refill encounter.              Failed - Normal serum creatinine on file in past 12 months     Recent Labs   Lab Test 09/27/21  1502   CR 1.02       Ok to refill medication if creatinine is low          Failed - Normal serum potassium on file in past 12 months     Recent Labs   Lab Test 09/27/21  1502   POTASSIUM 3.9             Passed - Medication is active on med list        Passed - Patient is age 18 or older             "

## 2022-11-23 RX ORDER — LISINOPRIL 20 MG/1
TABLET ORAL
Qty: 15 TABLET | Refills: 0 | Status: SHIPPED | OUTPATIENT
Start: 2022-11-23 | End: 2023-01-02

## 2022-11-23 NOTE — TELEPHONE ENCOUNTER
Overdue for visit. Last .  NO SHOW for appt 10-24-22.   Filled 15 days.  Schedule pt for face to face visit, will need labs.  Chary Lopez PA-C

## 2022-11-25 NOTE — TELEPHONE ENCOUNTER
LM for patient to return phone call to clinic about message below.  Sobeida Castillo CMA ([facility])

## 2023-01-02 ENCOUNTER — OFFICE VISIT (OUTPATIENT)
Dept: FAMILY MEDICINE | Facility: CLINIC | Age: 53
End: 2023-01-02
Payer: COMMERCIAL

## 2023-01-02 VITALS
OXYGEN SATURATION: 98 % | TEMPERATURE: 99.2 F | HEIGHT: 70 IN | SYSTOLIC BLOOD PRESSURE: 142 MMHG | HEART RATE: 68 BPM | DIASTOLIC BLOOD PRESSURE: 94 MMHG | BODY MASS INDEX: 26.14 KG/M2 | WEIGHT: 182.6 LBS

## 2023-01-02 DIAGNOSIS — I10 BENIGN ESSENTIAL HYPERTENSION: ICD-10-CM

## 2023-01-02 DIAGNOSIS — I10 BENIGN ESSENTIAL HYPERTENSION: Primary | ICD-10-CM

## 2023-01-02 DIAGNOSIS — Z13.220 SCREENING FOR HYPERLIPIDEMIA: ICD-10-CM

## 2023-01-02 DIAGNOSIS — Z12.11 SCREEN FOR COLON CANCER: ICD-10-CM

## 2023-01-02 PROCEDURE — 99214 OFFICE O/P EST MOD 30 MIN: CPT | Performed by: PHYSICIAN ASSISTANT

## 2023-01-02 RX ORDER — LISINOPRIL 20 MG/1
20 TABLET ORAL DAILY
Qty: 90 TABLET | Refills: 0 | Status: SHIPPED | OUTPATIENT
Start: 2023-01-02 | End: 2023-04-03

## 2023-01-02 ASSESSMENT — PAIN SCALES - GENERAL: PAINLEVEL: NO PAIN (0)

## 2023-01-02 NOTE — PROGRESS NOTES
"  Assessment & Plan     Benign essential hypertension  Pt is overdue to labs and evaluation of control on his lisinopril.   He has been out of meds for 2 weeks.   Filled 90 days only:  He is to return in 2-3 weeks for BP check with staff and labs once he has been back on his lisinopril.  Counseled on heart healthy eating and exercise.     - lisinopril (ZESTRIL) 20 MG tablet; Take 1 tablet (20 mg) by mouth daily Due for labs and nurse visit.  - Basic metabolic panel  (Ca, Cl, CO2, Creat, Gluc, K, Na, BUN); Future    Screening for hyperlipidemia  Reviewed prior lipids. He would like to repeat when he comes back for labs above  - Lipid panel reflex to direct LDL Fasting; Future    Screen for colon cancer  Counseled on screening recommendations. Discussed options including colonoscopy, cologuard, and FIT. Advised contacting insurance to verify coverage if pt has concerns. Pt understands that a positive cologuard or FIT test is followed ip with a diagnostic scope.Pt non-commital and declines an order for any of the above.         BMI:   Estimated body mass index is 26.14 kg/m  as calculated from the following:    Height as of this encounter: 1.78 m (5' 10.08\").    Weight as of this encounter: 82.8 kg (182 lb 9.6 oz).   Weight management plan: Discussed healthy diet and exercise guidelines    Follow Up: The patient was instructed to contact clinic for worsening symptoms, non-improvement in time frame as expected/discussed, and for questions regarding medications or treatment plan. For virtual visits, the patient was advised to be seen for in person evaluation if symptoms or condition are worsening or non-improvement as expected.       Return in about 2 weeks (around 1/16/2023) for Lab Work, BP Recheck.    Chary Lopez PA-C  [facility] [place] is a 52 year old, presenting for the following health issues:  Recheck Medication      History of Present Illness       Hypertension: He " "presents for follow up of hypertension.  He does not check blood pressure  regularly outside of the clinic. Outpatient blood pressures have not been over 140/90. He follows a low salt diet.      Pt has not taken meds in 2 weeks      ---    Hypertension- lisinopril 20mg.   His last visit was 16mo ago.  He ran out of lisinopril 2 weeks ago.   He is not checking BP  Exercise- little aerobics 30min cardio class, shoveling snow. 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    Lipids- he would like to screen when fasting (not today).     Screen for colon cancer - he states he does not want to do a scope. He would talk with wife any maybe consider a cologuard. Non-commital today.      Review of Systems   Constitutional, HEENT, cardiovascular, pulmonary, gi and gu systems are negative, except as otherwise noted.      Objective    BP (!) 142/94   Pulse 68   Temp 99.2  F (37.3  C) (Temporal)   Ht 1.78 m (5' 10.08\")   Wt 82.8 kg (182 lb 9.6 oz)   SpO2 98%   BMI 26.14 kg/m    Body mass index is 26.14 kg/m .  Physical Exam   GENERAL: healthy, alert and no distress  EYES: Eyes grossly normal to inspection, PERRL and conjunctivae and sclerae normal  HENT: ear canals and TM's normal, nose and mouth without ulcers or lesions  NECK: no adenopathy, no asymmetry, masses, or scars and thyroid normal to palpation  RESP: lungs clear to auscultation - no rales, rhonchi or wheezes  CV: regular rate and rhythm, normal S1 S2, no S3 or S4, no murmur, click or rub, no peripheral edema and peripheral pulses strong  ABDOMEN: soft, nontender, no hepatosplenomegaly, no masses and bowel sounds normal  NEURO: Normal strength and tone, mentation intact and speech normal  PSYCH: mentation appears normal, affect normal/bright              "

## 2023-01-05 RX ORDER — LISINOPRIL 20 MG/1
TABLET ORAL
Qty: 15 TABLET | OUTPATIENT
Start: 2023-01-05

## 2023-03-26 ENCOUNTER — HEALTH MAINTENANCE LETTER (OUTPATIENT)
Age: 53
End: 2023-03-26

## 2023-04-02 DIAGNOSIS — I10 BENIGN ESSENTIAL HYPERTENSION: ICD-10-CM

## 2023-04-03 ENCOUNTER — OFFICE VISIT (OUTPATIENT)
Dept: FAMILY MEDICINE | Facility: CLINIC | Age: 53
End: 2023-04-03
Payer: COMMERCIAL

## 2023-04-03 ENCOUNTER — LAB (OUTPATIENT)
Dept: FAMILY MEDICINE | Facility: CLINIC | Age: 53
End: 2023-04-03

## 2023-04-03 VITALS
OXYGEN SATURATION: 98 % | SYSTOLIC BLOOD PRESSURE: 122 MMHG | BODY MASS INDEX: 26.13 KG/M2 | HEIGHT: 70 IN | HEART RATE: 57 BPM | RESPIRATION RATE: 15 BRPM | DIASTOLIC BLOOD PRESSURE: 72 MMHG | TEMPERATURE: 98.1 F | WEIGHT: 182.5 LBS

## 2023-04-03 DIAGNOSIS — Z12.11 SCREEN FOR COLON CANCER: ICD-10-CM

## 2023-04-03 DIAGNOSIS — I10 BENIGN ESSENTIAL HYPERTENSION: ICD-10-CM

## 2023-04-03 DIAGNOSIS — Z00.00 ROUTINE GENERAL MEDICAL EXAMINATION AT A HEALTH CARE FACILITY: Primary | ICD-10-CM

## 2023-04-03 PROCEDURE — 36415 COLL VENOUS BLD VENIPUNCTURE: CPT | Performed by: PHYSICIAN ASSISTANT

## 2023-04-03 PROCEDURE — 80061 LIPID PANEL: CPT | Performed by: PHYSICIAN ASSISTANT

## 2023-04-03 PROCEDURE — 99396 PREV VISIT EST AGE 40-64: CPT | Performed by: PHYSICIAN ASSISTANT

## 2023-04-03 PROCEDURE — 99213 OFFICE O/P EST LOW 20 MIN: CPT | Mod: 25 | Performed by: PHYSICIAN ASSISTANT

## 2023-04-03 PROCEDURE — 80048 BASIC METABOLIC PNL TOTAL CA: CPT | Performed by: PHYSICIAN ASSISTANT

## 2023-04-03 PROCEDURE — G0103 PSA SCREENING: HCPCS | Performed by: PHYSICIAN ASSISTANT

## 2023-04-03 RX ORDER — LISINOPRIL 20 MG/1
20 TABLET ORAL DAILY
Qty: 90 TABLET | Refills: 3 | Status: SHIPPED | OUTPATIENT
Start: 2023-04-03 | End: 2024-04-05

## 2023-04-03 ASSESSMENT — ENCOUNTER SYMPTOMS
HEARTBURN: 0
PARESTHESIAS: 0
CONSTIPATION: 0
NAUSEA: 0
WEAKNESS: 0
HEMATURIA: 0
SHORTNESS OF BREATH: 0
ARTHRALGIAS: 0
DIARRHEA: 0
ABDOMINAL PAIN: 0
COUGH: 0
MYALGIAS: 0
HEMATOCHEZIA: 0
SORE THROAT: 0
NERVOUS/ANXIOUS: 0
DYSURIA: 0
FREQUENCY: 0
DIZZINESS: 0
FEVER: 0
HEADACHES: 0
EYE PAIN: 0
JOINT SWELLING: 0
PALPITATIONS: 0
CHILLS: 0

## 2023-04-03 ASSESSMENT — PAIN SCALES - GENERAL: PAINLEVEL: NO PAIN (0)

## 2023-04-03 NOTE — PROGRESS NOTES
"SUBJECTIVE:   CC: Eriberto is an 52 year old who presents for preventative health visit.       4/3/2023     4:59 PM   Additional Questions   Roomed by SB   Accompanied by self   Patient has been advised of split billing requirements and indicates understanding: Yes  Healthy Habits:     Getting at least 3 servings of Calcium per day:  Yes    Bi-annual eye exam:  Yes    Dental care twice a year:  Yes    Sleep apnea or symptoms of sleep apnea:  None    Diet:  Regular (no restrictions)    Frequency of exercise:  2-3 days/week    Duration of exercise:  15-30 minutes    Taking medications regularly:  Yes    Medication side effects:  None    PHQ-2 Total Score: 0    Additional concerns today:  No    Routine Health Maintenance:  Immunizations Due for: declines all   Colonoscopy: He declines to do the scope- \"I don't think my wife would want me to do a colonoscopy\". Fam hx of colon cancer: no  PSA screening: Last: never done one. Fam hx of prostate cancer: no  Sexually active: yes, , monogamous    Fasting: yes  Lipids screening: will do today  Diabetes screening: will do today     For exercise: skating hockey, coaching hockey. On ice frequently. ; feels good with exercise. Limitations with exercise due to: nothing. Denies CV sxs with exercise including CP, SOB, palpitations, BHATT, presyncope.    HTN- has been taking lisinorpil 20mg consistently since last visit.   Not checking BP at home.  Here today 122/72.  No side effects  Due for labs.    Incurred laceration above right eye and some bruising from hockey stick in game he played over the weekend.  Declines tetanus shot.    Today's PHQ-2 Score:       4/3/2023     4:52 PM   PHQ-2 ( 1999 [employer])   Q1: Little interest or pleasure in doing things 0   Q2: Feeling down, depressed or hopeless 0   PHQ-2 Score 0   Q1: Little interest or pleasure in doing things Not at all   Q2: Feeling down, depressed or hopeless Not at all   PHQ-2 Score 0       Have you ever done Advance Care " Planning? (For example, a Health Directive, POLST, or a discussion with a medical provider or your loved ones about your wishes): No, advance care planning information given to patient to review.  Patient declined advance care planning discussion at this time.    Social History     Tobacco Use     Smoking status: Never     Smokeless tobacco: Never   Vaping Use     Vaping status: Never Used     Passive vaping exposure: Yes   Substance Use Topics     Alcohol use: Yes     Alcohol/week: 10.0 standard drinks of alcohol     Types: 10 Standard drinks or equivalent per week     Comment: Maybe once a week or twice             4/3/2023     4:52 PM   Alcohol Use   Prescreen: >3 drinks/day or >7 drinks/week? No       Last PSA: No results found for: PSA    Reviewed orders with patient. Reviewed health maintenance and updated orders accordingly - Yes  Lab work is in process  Labs reviewed in EPIC  BP Readings from Last 3 Encounters:   04/03/23 122/72   01/02/23 (!) 142/94   09/27/21 126/86    Wt Readings from Last 3 Encounters:   04/03/23 82.8 kg (182 lb 8 oz)   01/02/23 82.8 kg (182 lb 9.6 oz)   09/27/21 83.8 kg (184 lb 12.8 oz)                  Patient Active Problem List   Diagnosis     CARDIOVASCULAR SCREENING; LDL GOAL LESS THAN 160     Plantar warts     Benign essential hypertension     Past Surgical History:   Procedure Laterality Date     ENT SURGERY       ZZC PE TUBE  2008    right side, several times       Social History     Tobacco Use     Smoking status: Never     Smokeless tobacco: Never   Vaping Use     Vaping status: Never Used     Passive vaping exposure: Yes   Substance Use Topics     Alcohol use: Yes     Alcohol/week: 10.0 standard drinks of alcohol     Types: 10 Standard drinks or equivalent per week     Comment: Maybe once a week or twice     Family History   Problem Relation Age of Onset     Hypertension Father      Melanoma No family hx of      Skin Cancer No family hx of          Current Outpatient  "Medications   Medication Sig Dispense Refill     lisinopril (ZESTRIL) 20 MG tablet Take 1 tablet (20 mg) by mouth daily Due for labs and nurse visit. 90 tablet 0     No Known Allergies    Reviewed and updated as needed this visit by clinical staff   Tobacco  Allergies  Meds              Reviewed and updated as needed this visit by Provider                     Review of Systems   Constitutional: Negative for chills and fever.   HENT: Negative for congestion, ear pain, hearing loss and sore throat.    Eyes: Negative for pain and visual disturbance.   Respiratory: Negative for cough and shortness of breath.    Cardiovascular: Negative for chest pain, palpitations and peripheral edema.   Gastrointestinal: Negative for abdominal pain, constipation, diarrhea, heartburn, hematochezia and nausea.   Genitourinary: Negative for dysuria, frequency, genital sores, hematuria, impotence, penile discharge and urgency.   Musculoskeletal: Negative for arthralgias, joint swelling and myalgias.   Skin: Negative for rash.   Neurological: Negative for dizziness, weakness, headaches and paresthesias.   Psychiatric/Behavioral: Negative for mood changes. The patient is not nervous/anxious.        OBJECTIVE:   /72   Pulse 57   Temp 98.1  F (36.7  C) (Temporal)   Resp 15   Ht 1.78 m (5' 10.08\")   Wt 82.8 kg (182 lb 8 oz)   SpO2 98%   BMI 26.13 kg/m      Physical Exam  GENERAL: healthy, alert and no distress  EYES: RIGHT eye: 2.5-3cm laceration above right eye below eyebrow. Wound margins approximated and together. No drainage. Small scab. erythema and swelling around the right eye. Left eyes grossly normal to inspection. PERRL and conjunctivae and sclerae normal  HENT: ear canals and TM's normal, nose and mouth without ulcers or lesions  NECK: no adenopathy, no asymmetry, masses, or scars and thyroid normal to palpation  RESP: lungs clear to auscultation - no rales, rhonchi or wheezes  CV: regular rate and rhythm, normal S1 " S2, no S3 or S4, no murmur, click or rub, no peripheral edema and peripheral pulses strong  ABDOMEN: soft, nontender, no hepatosplenomegaly, no masses and bowel sounds normal   (male): declines  RECTAL (male): declines  MS: no gross musculoskeletal defects noted, no edema  SKIN: no suspicious lesions or rashes  NEURO: Normal strength and tone, mentation intact and speech normal  PSYCH: mentation appears normal, affect normal/bright  LYMPH: no cervical, supraclavicular, axillary, or inguinal adenopathy    Diagnostic Test Results:  Labs reviewed in Epic  No results found for any visits on 04/03/23.    ASSESSMENT/PLAN:       ICD-10-CM    1. Routine general medical examination at a health care facility  Z00.00 PSA, screen     Lipid panel reflex to direct LDL Fasting     PSA, screen     Lipid panel reflex to direct LDL Fasting      2. Benign essential hypertension  I10 Basic metabolic panel  (Ca, Cl, CO2, Creat, Gluc, K, Na, BUN)     Basic metabolic panel  (Ca, Cl, CO2, Creat, Gluc, K, Na, BUN)     lisinopril (ZESTRIL) 20 MG tablet      3. Screen for colon cancer  Z12.11 COLOGUARD(EXACT SCIENCES)        1. Routine general medical examination at a health care facility  Reviewed VS and weight/BMI with pt   Immunizations:  discussed and pt declined. Overdue for tetanus- discussed implications, and should he incur a cut to come for update.  Counseling as below   Health screenings/maintenance per orders/comments below  When applicable based on age, personal hx, fam hx, and RF- counseled on appropriate cancer screenings.   Discussed risk factors and recommendations for screening for Hep C and HIV - agrees or declines (removed HMA)    - PSA, screen; Future  - Lipid panel reflex to direct LDL Fasting; Future  - PSA, screen  - Lipid panel reflex to direct LDL Fasting    2. Benign essential hypertension  Stable, sx well controlled, tolerates medication(s) without side effects. Refilled x 1yr  - Basic metabolic panel  (Ca, Cl,  CO2, Creat, Gluc, K, Na, BUN); Future  - Basic metabolic panel  (Ca, Cl, CO2, Creat, Gluc, K, Na, BUN)  - lisinopril (ZESTRIL) 20 MG tablet; Take 1 tablet (20 mg) by mouth daily  Dispense: 90 tablet; Refill: 3    3. Screen for colon cancer  Counseled on screening recommendations. Discussed options including colonoscopy, cologuard, and FIT. Advised contacting insurance to verify coverage if pt has concerns. Pt understands that a positive cologuard or FIT test is followed ip with a diagnostic scope.Pt is interested in cologuard. Order placed.    - COLOGUARD([employer]ES); Future      Patient has been advised of split billing requirements and indicates understanding: Yes      COUNSELING:   Reviewed preventive health counseling, as reflected in patient instructions       Regular exercise       Healthy diet/nutrition       Colorectal cancer screening       Prostate cancer screening       vaccines        He reports that he has never smoked. He has never used smokeless tobacco.            Chary Lopez PA-C  [facility]

## 2023-04-03 NOTE — PATIENT INSTRUCTIONS
Colon cancer screening- call your insurance for coverage.   I sent an order for the cologuard test.     Labs today     Fax for Biometric screening - [phone] Attn Chary Lopez PA-C      Preventive Health Recommendations  Male Ages 50 - 64    Yearly exam:             See your health care provider every year in order to  o   Review health changes.   o   Discuss preventive care.    o   Review your medicines if your doctor has prescribed any.   Have a cholesterol test every 5 years, or more frequently if you are at risk for high cholesterol/heart disease.   Have a diabetes test (fasting glucose) every three years. If you are at risk for diabetes, you should have this test more often.   Have a colonoscopy at age 50, or have a yearly FIT test (stool test). These exams will check for colon cancer.    Talk with your health care provider about whether or not a prostate cancer screening test (PSA) is right for you.  You should be tested each year for STDs (sexually transmitted diseases), if you re at risk.     Shots: Get a flu shot each year. Get a tetanus shot every 10 years.     Nutrition:  Eat at least 5 servings of fruits and vegetables daily.   Eat whole-grain bread, whole-wheat pasta and brown rice instead of white grains and rice.   Get adequate Calcium and Vitamin D.     Lifestyle  Exercise for at least 150 minutes a week (30 minutes a day, 5 days a week). This will help you control your weight and prevent disease.   Limit alcohol to one drink per day.   No smoking.   Wear sunscreen to prevent skin cancer.   See your dentist every six months for an exam and cleaning.   See your eye doctor every 1 to 2 years.

## 2023-04-03 NOTE — LETTER
"April 11, 2023      Eriberto Cordon  [address]  [place] [address]        Dear ,    We are writing to inform you of your test results.    Cholesterol is normal . No concerns here.    Kidney function (serum creatinine and GFR) and electrolyte tests are normal.     PSA - prostate cancer screening blood test is normal and stable.    Interpreting Cholesterol Profile Results:  LDL is the \"bad\" cholesterol that builds in the walls of the arteries forming plaques making them narrow, hard and reducing blood flow. LDL cholesterol comes from animal based The HDL is protective or \"good cholesterol\"; exercise can boost this level. Triglycerides are sugary-fats in the blood; a diet high in carbohydrates (bread, pasta, rice, chips, sweets) and alcohol raises this level. The ratio of HDL to total cholesterol under 5.0 is best.      Resulted Orders   PSA, screen   Result Value Ref Range    Prostate Specific Antigen Screen 0.34 0.00 - 4.00 ug/L   Basic metabolic panel  (Ca, Cl, CO2, Creat, Gluc, K, Na, BUN)   Result Value Ref Range    Sodium 139 133 - 144 mmol/L    Potassium 4.0 3.4 - 5.3 mmol/L    Chloride 110 (H) 94 - 109 mmol/L    Carbon Dioxide (CO2) 26 20 - 32 mmol/L    Anion Gap 3 3 - 14 mmol/L    Urea Nitrogen 13 7 - 30 mg/dL    Creatinine 1.03 0.66 - 1.25 mg/dL    Calcium 8.8 8.5 - 10.1 mg/dL    Glucose 100 (H) 70 - 99 mg/dL    GFR Estimate 88 >60 mL/min/1.73m2      Comment:      eGFR calculated using 2021 CKD-EPI equation.   Lipid panel reflex to direct LDL Fasting   Result Value Ref Range    Cholesterol 180 <200 mg/dL    Triglycerides 63 <150 mg/dL    Direct Measure HDL 65 >=40 mg/dL    LDL Cholesterol Calculated 102 (H) <=100 mg/dL    Non HDL Cholesterol 115 <130 mg/dL    Patient Fasting > 8hrs? Unknown     Narrative    Cholesterol  Desirable:  <200 mg/dL    Triglycerides  Normal:  Less than 150 mg/dL  Borderline High:  150-199 mg/dL  High:  200-499 mg/dL  Very High:  Greater than or equal to 500 " mg/dL    Direct Measure HDL  Female:  Greater than or equal to 50 mg/dL   Male:  Greater than or equal to 40 mg/dL    LDL Cholesterol  Desirable:  <100mg/dL  Above Desirable:  100-129 mg/dL   Borderline High:  130-159 mg/dL   High:  160-189 mg/dL   Very High:  >= 190 mg/dL    Non HDL Cholesterol  Desirable:  130 mg/dL  Above Desirable:  130-159 mg/dL  Borderline High:  160-189 mg/dL  High:  190-219 mg/dL  Very High:  Greater than or equal to 220 mg/dL       If you have any questions or concerns, please call the clinic at the number listed above.       Sincerely,      Chary Lopez PA-C

## 2023-04-04 LAB
ANION GAP SERPL CALCULATED.3IONS-SCNC: 3 MMOL/L (ref 3–14)
BUN SERPL-MCNC: 13 MG/DL (ref 7–30)
CALCIUM SERPL-MCNC: 8.8 MG/DL (ref 8.5–10.1)
CHLORIDE BLD-SCNC: 110 MMOL/L (ref 94–109)
CHOLEST SERPL-MCNC: 180 MG/DL
CO2 SERPL-SCNC: 26 MMOL/L (ref 20–32)
CREAT SERPL-MCNC: 1.03 MG/DL (ref 0.66–1.25)
FASTING STATUS PATIENT QL REPORTED: ABNORMAL
GFR SERPL CREATININE-BSD FRML MDRD: 88 ML/MIN/1.73M2
GLUCOSE BLD-MCNC: 100 MG/DL (ref 70–99)
HDLC SERPL-MCNC: 65 MG/DL
LDLC SERPL CALC-MCNC: 102 MG/DL
NONHDLC SERPL-MCNC: 115 MG/DL
POTASSIUM BLD-SCNC: 4 MMOL/L (ref 3.4–5.3)
PSA SERPL-MCNC: 0.34 UG/L (ref 0–4)
SODIUM SERPL-SCNC: 139 MMOL/L (ref 133–144)
TRIGL SERPL-MCNC: 63 MG/DL

## 2023-04-05 RX ORDER — LISINOPRIL 20 MG/1
TABLET ORAL
Qty: 90 TABLET | Refills: 0 | OUTPATIENT
Start: 2023-04-05

## 2024-03-04 ENCOUNTER — PATIENT OUTREACH (OUTPATIENT)
Dept: CARE COORDINATION | Facility: CLINIC | Age: 54
End: 2024-03-04
Payer: COMMERCIAL

## 2024-03-18 ENCOUNTER — PATIENT OUTREACH (OUTPATIENT)
Dept: CARE COORDINATION | Facility: CLINIC | Age: 54
End: 2024-03-18
Payer: COMMERCIAL

## 2024-04-04 ENCOUNTER — TELEPHONE (OUTPATIENT)
Dept: FAMILY MEDICINE | Facility: CLINIC | Age: 54
End: 2024-04-04
Payer: COMMERCIAL

## 2024-04-04 DIAGNOSIS — I10 BENIGN ESSENTIAL HYPERTENSION: ICD-10-CM

## 2024-04-04 NOTE — LETTER
April 11, 2024      Eriberto STANFORD Neris  [address] [address]  [place] [address]              Lolis Eriberto,     It looks like Chary did send a 30 day supply to your pharmacy but stated an in person visit with her or another provider would be needed for further refills. She also stated the following:        An annual visit is required for medication renewal and monitoring labs. I will not refill medications based on only a staff blood pressure check.  A physical is for preventive care. His previously established diagnosis of hypertension that is being treated is NOT covered as a preventive service.  If he has questions about specific costs related to his personal insurance plan, then the responsibility is his, to call his insurance company and clarify his concerns.      Chary Lopez PA-C      Please schedule an in person visit with the provider of your choice for a medication recheck or a physical or both. You can schedule that here on [employer] or by calling [phone].     [facility] Team

## 2024-04-05 DIAGNOSIS — I10 BENIGN ESSENTIAL HYPERTENSION: ICD-10-CM

## 2024-04-05 RX ORDER — LISINOPRIL 20 MG/1
20 TABLET ORAL DAILY
Qty: 30 TABLET | Refills: 0 | Status: SHIPPED | OUTPATIENT
Start: 2024-04-05 | End: 2024-05-03

## 2024-04-05 RX ORDER — LISINOPRIL 20 MG/1
20 TABLET ORAL DAILY
Qty: 90 TABLET | OUTPATIENT
Start: 2024-04-05

## 2024-04-09 NOTE — TELEPHONE ENCOUNTER
Please call pt (if staff feels should be addressed by clinic manager please forward to her):   An annual visit is required for medication renewal and monitoring labs. I will not refill medications based on only a staff blood pressure check.  A physical is for preventive care. His previously established diagnosis of hypertension that is being treated is NOT covered as a preventive service.  If he has questions about specific costs related to his personal insurance plan, then the responsibility is his, to call his insurance company and clarify his concerns.   Chary Lopez PA-C

## 2024-04-09 NOTE — TELEPHONE ENCOUNTER
" Provider, patient is wondering if he can just come in for a BP check only without seeing a provider for medication renewal. Please advise.         Spoke to patient and advised him of message below. Patient scheduled appointment. Asked patient if he would like to schedule physical as well. Patient was upset and argued about having to be seen just to get medication refilled. Patient states \"It's $180 just to come in and get my medication refilled. And I have to do this every year?\" Told patient that it has been a year since he was last seen and will need to be seen prior to further refills. Patient asked if it is cheaper to do physical and blood pressure check at the same time. Informed him of split billing, patient asked if it will be cheaper and which is the cheaper option. Told patient that I do not work in billing, so I would not know what the cheaper option is and if he would like, we can get him to billing or he can go into his [employer] and send billing a message about which would be a cheaper option. He kept asking which is cheaper. Reiterated that I do not work in billing and would have to get billing on the phone. He states \"You don't work in billing. You have said that 3 times. You people always say that. You people need to have the information before scheduling, you know that? So if I schedule a physical, then the visit is free.\" Let patient know that I do not know what his plan entails so I cannot confirm this and that he would have to contact his health insurance to find out what is covered in his plan during a physical and how much of it is going to be covered. Patient continues to complain about health insurance and how confusing it is. I explained to patient that there is a message to call him to schedule a visit to refill his medication.     Patient was silent and then asked if he can just come in and do a blood pressure check without seeing provider for medication renewal. Will route to provider for " review.

## 2024-05-02 DIAGNOSIS — I10 BENIGN ESSENTIAL HYPERTENSION: ICD-10-CM

## 2024-05-03 RX ORDER — LISINOPRIL 20 MG/1
20 TABLET ORAL DAILY
Qty: 30 TABLET | Refills: 0 | Status: SHIPPED | OUTPATIENT
Start: 2024-05-03 | End: 2024-06-06

## 2024-05-03 NOTE — TELEPHONE ENCOUNTER
Pt scheduled for upcoming visit.  Filling bridge to get to appt.   Future Appointments 5/3/2024 - 10/30/2024        Date Visit Type Length Department Provider     5/13/2024  2:00 PM OFFICE VISIT 30 min [facility] Chary Lopez PA-C    Location Instructions:     [facility] is located at [address] [address], one mile north of the [place] [address] exit off of [address] [address]. From [address] [facility], exit to turn west on [address] [place] [address], then turn south on [address].                       Chary Lopez PA-C

## 2024-05-26 ENCOUNTER — HEALTH MAINTENANCE LETTER (OUTPATIENT)
Age: 54
End: 2024-05-26

## 2024-06-06 ENCOUNTER — TELEPHONE (OUTPATIENT)
Dept: FAMILY MEDICINE | Facility: CLINIC | Age: 54
End: 2024-06-06
Payer: COMMERCIAL

## 2024-06-06 DIAGNOSIS — I10 BENIGN ESSENTIAL HYPERTENSION: ICD-10-CM

## 2024-06-06 RX ORDER — LISINOPRIL 20 MG/1
20 TABLET ORAL DAILY
Qty: 30 TABLET | Refills: 0 | OUTPATIENT
Start: 2024-06-06

## 2024-06-06 NOTE — TELEPHONE ENCOUNTER
This writer attempted to contact patient on 06/06/24 to relay message from provider and left message.      If they call back:  Please relay message below from provider and assist with scheduling if appropriate. Patient has upcoming visit at [facility] on 6/12/24. Please contact pharmacy to prevent further requests for medication refills for Dr. Hightower.        Angela Pelayo, RN, BSN, PHN  [facility]  Nurse Triage, Family Practice

## 2024-06-06 NOTE — TELEPHONE ENCOUNTER
"Patient returning miss call about refill request. Relayed provider's message below. Patient states he already has appointment for 6/12/24. Patient states he has \"a couple of pills left\" but will run out of medication before his appointment and wondering if he can get additional refill to last him until then.     Writer noted last prescriber of medication was Chary Lopez and advise can sent this provider the refill request, patient agreed. Patient asked if he has fasting labs? Informed patient writer does not see any future labs ordered and unable to advise on this.     Routing to provider to review and advise if marisol refill until appointment can be granted and if lab orders can be place ahead of time or will patient need to wait until appointment?     Please have provider's care team follow up with patient.     LATOYA James  [facility] Triage  [place]            "

## 2024-06-07 DIAGNOSIS — I10 BENIGN ESSENTIAL HYPERTENSION: ICD-10-CM

## 2024-06-07 RX ORDER — LISINOPRIL 20 MG/1
20 TABLET ORAL DAILY
Qty: 30 TABLET | Refills: 0 | Status: SHIPPED | OUTPATIENT
Start: 2024-06-07 | End: 2024-06-12

## 2024-06-07 RX ORDER — LISINOPRIL 20 MG/1
20 TABLET ORAL DAILY
Qty: 90 TABLET | OUTPATIENT
Start: 2024-06-07

## 2024-06-07 NOTE — TELEPHONE ENCOUNTER
RN Triage    Patient Contact    Attempt # 1    Was call answered?  No.  Left message on voicemail with information to call me back.  Irasema Vargas RN on 6/7/2024 at 2:46 PM

## 2024-06-07 NOTE — TELEPHONE ENCOUNTER
Refilled.  Pt is scheduled for physical and refill of blood pressure meds next week.    If he would like cholesterol checked and diabetes screening with his physical then recommend coming to appt fasting. Fasting is 8-12 hours - water and black coffee ok.     Chary Lopez PA-C

## 2024-06-10 NOTE — TELEPHONE ENCOUNTER
RN Triage    Patient Contact    Attempt # 2    Was call answered?  No.  Left message on voicemail with information to call me back.    Upon call back please relay below.      Refilled.  Pt is scheduled for physical and refill of blood pressure meds next week.     If he would like cholesterol checked and diabetes screening with his physical then recommend coming to appt fasting. Fasting is 8-12 hours - water and black coffee ok.      PRIYANKA Geronimo message sent as well.    Laura Braxton RN  [facility] - Registered Nurse  Clinic Triage [place]   Stefani 10, 2024

## 2024-06-12 ENCOUNTER — ORDERS ONLY (AUTO-RELEASED) (OUTPATIENT)
Dept: FAMILY MEDICINE | Facility: CLINIC | Age: 54
End: 2024-06-12

## 2024-06-12 ENCOUNTER — OFFICE VISIT (OUTPATIENT)
Dept: FAMILY MEDICINE | Facility: CLINIC | Age: 54
End: 2024-06-12
Payer: COMMERCIAL

## 2024-06-12 VITALS
BODY MASS INDEX: 25.9 KG/M2 | HEART RATE: 69 BPM | OXYGEN SATURATION: 99 % | RESPIRATION RATE: 16 BRPM | SYSTOLIC BLOOD PRESSURE: 124 MMHG | DIASTOLIC BLOOD PRESSURE: 80 MMHG | WEIGHT: 180.9 LBS | HEIGHT: 70 IN | TEMPERATURE: 98 F

## 2024-06-12 DIAGNOSIS — Z00.00 ROUTINE GENERAL MEDICAL EXAMINATION AT A HEALTH CARE FACILITY: Primary | ICD-10-CM

## 2024-06-12 DIAGNOSIS — Z00.00 ROUTINE GENERAL MEDICAL EXAMINATION AT A HEALTH CARE FACILITY: ICD-10-CM

## 2024-06-12 DIAGNOSIS — Z12.11 SCREEN FOR COLON CANCER: ICD-10-CM

## 2024-06-12 DIAGNOSIS — E83.52 SERUM CALCIUM ELEVATED: ICD-10-CM

## 2024-06-12 DIAGNOSIS — I10 BENIGN ESSENTIAL HYPERTENSION: ICD-10-CM

## 2024-06-12 DIAGNOSIS — Z12.5 SPECIAL SCREENING FOR MALIGNANT NEOPLASM OF PROSTATE: ICD-10-CM

## 2024-06-12 PROCEDURE — G0103 PSA SCREENING: HCPCS | Performed by: PHYSICIAN ASSISTANT

## 2024-06-12 PROCEDURE — 36415 COLL VENOUS BLD VENIPUNCTURE: CPT | Performed by: PHYSICIAN ASSISTANT

## 2024-06-12 PROCEDURE — 99213 OFFICE O/P EST LOW 20 MIN: CPT | Mod: 25 | Performed by: PHYSICIAN ASSISTANT

## 2024-06-12 PROCEDURE — 99396 PREV VISIT EST AGE 40-64: CPT | Performed by: PHYSICIAN ASSISTANT

## 2024-06-12 PROCEDURE — 80061 LIPID PANEL: CPT | Performed by: PHYSICIAN ASSISTANT

## 2024-06-12 PROCEDURE — 80048 BASIC METABOLIC PNL TOTAL CA: CPT | Performed by: PHYSICIAN ASSISTANT

## 2024-06-12 RX ORDER — LISINOPRIL 20 MG/1
20 TABLET ORAL DAILY
Qty: 90 TABLET | Refills: 3 | Status: SHIPPED | OUTPATIENT
Start: 2024-06-12

## 2024-06-12 SDOH — HEALTH STABILITY: PHYSICAL HEALTH: ON AVERAGE, HOW MANY DAYS PER WEEK DO YOU ENGAGE IN MODERATE TO STRENUOUS EXERCISE (LIKE A BRISK WALK)?: 2 DAYS

## 2024-06-12 ASSESSMENT — SOCIAL DETERMINANTS OF HEALTH (SDOH): HOW OFTEN DO YOU GET TOGETHER WITH FRIENDS OR RELATIVES?: TWICE A WEEK

## 2024-06-12 NOTE — PROGRESS NOTES
"  Assessment & Plan     Routine general medical examination at a health care facility  Reviewed VS, weight/BMI   Routine screenings see orders  Immunizations: see orders and documentation in HPI. Discussed vaccines coverage as pharmacy benefit.  Health and cancer screening recommendations delivered according to the USPSTF and other appropriate society guidelines  Nutrition and exercise counseling performed.    - REVIEW OF HEALTH MAINTENANCE PROTOCOL ORDERS  - BASIC METABOLIC PANEL; Future  - Lipid panel reflex to direct LDL Fasting; Future  - PSA, screen; Future  - COLOGUARD(EXACT SCIENCES); Future  - BASIC METABOLIC PANEL  - Lipid panel reflex to direct LDL Fasting  - PSA, screen    Benign essential hypertension  Blood pressures at goal. Tolerating medications well, refilled.   Labs per orders if due.  Advised DASH diet and heart healthy eating plan, regular exercise, wt management, and limited alcohol advised  - BASIC METABOLIC PANEL; Future  - lisinopril (ZESTRIL) 20 MG tablet; Take 1 tablet (20 mg) by mouth daily  - BASIC METABOLIC PANEL    Screen for colon cancer  Counseled on screening recommendations. Discussed options including colonoscopy, cologuard, and FIT. Advised contacting insurance to verify coverage if pt has concerns. Pt understands that a positive cologuard or FIT test is followed up with a diagnostic scope.Pt is interested in cologuard. Order placed.  - COLOGUARD(EXACT SCIENCES); Future    Special screening for malignant neoplasm of prostate  Discussed PSA screening guidelines. Pt agreeable to screen.   - PSA, screen; Future  - PSA, screen    Patient has been advised of split billing requirements and indicates understanding: Yes        BMI  Estimated body mass index is 25.77 kg/m  as calculated from the following:    Height as of this encounter: 1.784 m (5' 10.25\").    Weight as of this encounter: 82.1 kg (180 lb 14.4 oz).       Counseling  Appropriate preventive services were discussed with this " patient, including applicable screening as appropriate for fall prevention, nutrition, physical activity, Tobacco-use cessation, weight loss and cognition.  Checklist reviewing preventive services available has been given to the patient.  Reviewed patient's diet, addressing concerns and/or questions.   He is at risk for lack of exercise and has been provided with information to increase physical activity for the benefit of his well-being.   He is at risk for psychosocial distress and has been provided with information to reduce risk.       Follow Up: see above. Additionally patient was instructed to contact clinic for worsening symptoms, non-improvement in time frame discussed, and for questions regarding treatment plan.   For virtual visits, the patient was advised to be seen for in person evaluation if symptoms or condition are worsening or non-improvement as expected.   PRIYANKA Geronimo   Eriberto is a 53 year old, presenting for the following health issues:  Physical and Hypertension        6/12/2024     2:35 PM   Additional Questions   Roomed by BT   Accompanied by Self     HPI     Routine Health Maintenance:  Immunizations:   Colon cancer screening: would pref to do cologuard. Got the kit last year but states didn't complete it.     Fasting: yes   Lipids screening: he would like lipid screening today.  Reviewed lipids from last year.  Diabetes screening: last year glucose 100. Unknown if fasting.     HTN- on lisinopril 20mg daily.   Takes medication daily. No side effects.  He is not checking his pressures at home.   He is not consciously limiting salt in diet.   For exercise- was playing  hockey but not recently. Lifting weights a few times a week. Aerobics/cardio 1-2x a week at home. Feels good with exercise. No CV sx.         Review of Systems  Constitutional, HEENT, cardiovascular, pulmonary, gi and gu systems are negative, except as otherwise noted.      Objective    /80    "Pulse 69   Temp 98  F (36.7  C) (Temporal)   Resp 16   Ht 1.784 m (5' 10.25\")   Wt 82.1 kg (180 lb 14.4 oz)   SpO2 99%   BMI 25.77 kg/m    Body mass index is 25.77 kg/m .  Physical Exam   GENERAL: alert and no distress  EYES: Eyes grossly normal to inspection, PERRL and conjunctivae and sclerae normal  HENT: ear canals and TM's normal, nose and mouth without ulcers or lesions  NECK: no adenopathy, no asymmetry, masses, or scars  RESP: lungs clear to auscultation - no rales, rhonchi or wheezes  CV: regular rate and rhythm, normal S1 S2, no S3 or S4, no murmur, click or rub, no peripheral edema  ABDOMEN: soft, nontender, no hepatosplenomegaly, no masses and bowel sounds normal  MS: no gross musculoskeletal defects noted, no edema  SKIN: no suspicious lesions or rashes  NEURO: Normal strength and tone, mentation intact and speech normal  PSYCH: mentation appears normal, affect normal/bright    No results found for any visits on 06/12/24.        Signed Electronically by: Chary Lopez PA-C    "

## 2024-06-12 NOTE — PATIENT INSTRUCTIONS
"Patient Education   Preventive Care Advice   This is general advice we often give to help people stay healthy. Your care team may have specific advice just for you. Please talk to your care team about your own preventive care needs.  Lifestyle  Exercise at least 150 minutes each week (30 minutes a day, 5 days a week).  Do muscle strengthening activities 2 days a week. These help control your weight and prevent disease.  No smoking.  Wear sunscreen to prevent skin cancer.  Have your home tested for radon every 2 to 5 years. Radon is a colorless, odorless gas that can harm your lungs. To learn more, go to www.health.[facility].[place].us and search for \"Radon in Homes.\"  Keep guns unloaded and locked up in a safe place like a safe or gun vault, or, use a gun lock and hide the keys. Always lock away bullets separately. To learn more, visit [employer].[place].gov and search for \"safe gun storage.\"  Nutrition  Eat 5 or more servings of fruits and vegetables each day.  Try wheat bread, brown rice and whole grain pasta (instead of white bread, rice, and pasta).  Get enough calcium and vitamin D. Check the label on foods and aim for 100% of the RDA (recommended daily allowance).  Regular exams  Have a dental exam and cleaning every 6 months.  See your health care team every year to talk about:  Any changes in your health.  Any medicines your care team has prescribed.  Preventive care, family planning, and ways to prevent chronic diseases.  Shots (vaccines)   HPV shots (up to age 26), if you've never had them before.  Hepatitis B shots (up to age 59), if you've never had them before.  COVID-19 shot: Get this shot when it's due.  Flu shot: Get a flu shot every year.  Tetanus shot: Get a tetanus shot every 10 years.  Pneumococcal, hepatitis A, and RSV shots: Ask your care team if you need these based on your risk.  Shingles shot (for age 50 and up).  General health tests  Diabetes screening:  Starting at age 35, Get screened for diabetes at least " every 3 years.  If you are younger than age 35, ask your care team if you should be screened for diabetes.  Cholesterol test: At age 39, start having a cholesterol test every 5 years, or more often if advised.  Bone density scan (DEXA): At age 50, ask your care team if you should have this scan for osteoporosis (brittle bones).  Hepatitis C: Get tested at least once in your life.  Abdominal aortic aneurysm screening: Talk to your doctor about having this screening if you:  Have ever smoked; and  Are biologically male; and  Are between the ages of 65 and 75.  STIs (sexually transmitted infections)  Before age 24: Ask your care team if you should be screened for STIs.  After age 24: Get screened for STIs if you're at risk. You are at risk for STIs (including HIV) if:  You are sexually active with more than one person.  You don't use condoms every time.  You or a partner was diagnosed with a sexually transmitted infection.  If you are at risk for HIV, ask about PrEP medicine to prevent HIV.  Get tested for HIV at least once in your life, whether you are at risk for HIV or not.  Cancer screening tests  Cervical cancer screening: If you have a cervix, begin getting regular cervical cancer screening tests at age 21. Most people who have regular screenings with normal results can stop after age 65. Talk about this with your provider.  Breast cancer scan (mammogram): If you've ever had breasts, begin having regular mammograms starting at age 40. This is a scan to check for breast cancer.  Colon cancer screening: It is important to start screening for colon cancer at age 45.  Have a colonoscopy test every 10 years (or more often if you're at risk) Or, ask your provider about stool tests like a FIT test every year or Cologuard test every 3 years.  To learn more about your testing options, visit: www.[employer]/454178.pdf.  For help making a decision, visit: [employer]us/qr40092.  Prostate cancer screening test: If you have a  prostate and are age 55 to 69, ask your provider if you would benefit from a yearly prostate cancer screening test.  Lung cancer screening: If you are a current or former smoker age 50 to 80, ask your care team if ongoing lung cancer screenings are right for you.  For informational purposes only. Not to replace the advice of your health care provider. Copyright   2023 [facility]. All rights reserved. Clinically reviewed by the [facility] Transitions Program. [employer] 065437 - REV 04/24.

## 2024-06-13 LAB
ANION GAP SERPL CALCULATED.3IONS-SCNC: 12 MMOL/L (ref 7–15)
BUN SERPL-MCNC: 12.2 MG/DL (ref 6–20)
CALCIUM SERPL-MCNC: 10.4 MG/DL (ref 8.6–10)
CHLORIDE SERPL-SCNC: 105 MMOL/L (ref 98–107)
CHOLEST SERPL-MCNC: 196 MG/DL
CREAT SERPL-MCNC: 1.14 MG/DL (ref 0.67–1.17)
DEPRECATED HCO3 PLAS-SCNC: 25 MMOL/L (ref 22–29)
EGFRCR SERPLBLD CKD-EPI 2021: 77 ML/MIN/1.73M2
FASTING STATUS PATIENT QL REPORTED: ABNORMAL
FASTING STATUS PATIENT QL REPORTED: ABNORMAL
GLUCOSE SERPL-MCNC: 93 MG/DL (ref 70–99)
HDLC SERPL-MCNC: 52 MG/DL
LDLC SERPL CALC-MCNC: 130 MG/DL
NONHDLC SERPL-MCNC: 144 MG/DL
POTASSIUM SERPL-SCNC: 4.9 MMOL/L (ref 3.4–5.3)
PSA SERPL DL<=0.01 NG/ML-MCNC: 0.3 NG/ML (ref 0–3.5)
SODIUM SERPL-SCNC: 142 MMOL/L (ref 135–145)
TRIGL SERPL-MCNC: 69 MG/DL

## 2024-07-09 DIAGNOSIS — I10 BENIGN ESSENTIAL HYPERTENSION: ICD-10-CM

## 2024-07-09 RX ORDER — LISINOPRIL 20 MG/1
20 TABLET ORAL DAILY
Qty: 90 TABLET | Refills: 3 | OUTPATIENT
Start: 2024-07-09

## 2024-07-09 NOTE — TELEPHONE ENCOUNTER
Medication Question or Refill        What medication are you calling about (include dose and sig)?: Lisinopril 20MG    Preferred Pharmacy:       [id number] - [place], [place] - [address]  [place] [address]  Phone: [phone] Fax: [phone]      Controlled Substance Agreement on file:   CSA -- Patient Level:    CSA: None found at the patient level.       Who prescribed the medication?: Chary Lopez    Do you need a refill? Yes    When did you use the medication last? 7/9/24    Patient offered an appointment? No    Do you have any questions or concerns?  Yes: patient leaves for out of town and would need a renewal in the time frame he is gone if he can get his Meds ASAP      Could we send this information to you in [facility] or would you prefer to receive a phone call?:   Patient would prefer a phone call   Okay to leave a detailed message?: Yes at Cell number on file:    Telephone Information:   Mobile [phone]

## 2025-05-13 ENCOUNTER — PATIENT OUTREACH (OUTPATIENT)
Dept: CARE COORDINATION | Facility: CLINIC | Age: 55
End: 2025-05-13
Payer: COMMERCIAL

## 2025-05-27 ENCOUNTER — PATIENT OUTREACH (OUTPATIENT)
Dept: CARE COORDINATION | Facility: CLINIC | Age: 55
End: 2025-05-27
Payer: COMMERCIAL

## 2025-06-19 ENCOUNTER — TELEPHONE (OUTPATIENT)
Dept: FAMILY MEDICINE | Facility: CLINIC | Age: 55
End: 2025-06-19
Payer: COMMERCIAL

## 2025-06-19 DIAGNOSIS — I10 BENIGN ESSENTIAL HYPERTENSION: ICD-10-CM

## 2025-06-19 RX ORDER — LISINOPRIL 20 MG/1
20 TABLET ORAL DAILY
Qty: 30 TABLET | Refills: 0 | Status: SHIPPED | OUTPATIENT
Start: 2025-06-19

## 2025-06-19 RX ORDER — LISINOPRIL 20 MG/1
20 TABLET ORAL DAILY
Qty: 90 TABLET | OUTPATIENT
Start: 2025-06-19

## 2025-06-19 NOTE — LETTER
June 23, 2025      Eriberto Corodn  [address] [facility] [address]              Dear Eriberto,    We recently received a refill request for your medication:  lisinopril (ZESTRIL) 20 MG tablet      Chary has refilled your medication for a one time marisol refill.  At this time you are due for a follow up visit for your blood pressure.  She also recommends you schedule your preventative care visit (physical).  If you choose you can complete both visits together (please check with your insurance on coverage).     We look forward to seeing you soon!     (Please note future refill requests may be denied without a completed appointment)     Sincerely,     [facility]  Care Team/Chary Lopez PA-C

## 2025-06-19 NOTE — TELEPHONE ENCOUNTER
Due for visit(s):  Due for visit for medication renewal for HTN meds and (will need labs).   Also - Due for physical (come fasting). Could do both visits together.   Refilling 30 days.   Please schedule  Chary Lopez PA-C

## 2025-07-26 ENCOUNTER — HEALTH MAINTENANCE LETTER (OUTPATIENT)
Age: 55
End: 2025-07-26

## 2025-07-27 DIAGNOSIS — I10 BENIGN ESSENTIAL HYPERTENSION: ICD-10-CM

## 2025-07-28 DIAGNOSIS — I10 BENIGN ESSENTIAL HYPERTENSION: ICD-10-CM

## 2025-07-28 RX ORDER — LISINOPRIL 20 MG/1
20 TABLET ORAL DAILY
Qty: 30 TABLET | Refills: 0 | Status: SHIPPED | OUTPATIENT
Start: 2025-07-28 | End: 2025-07-29

## 2025-07-28 NOTE — TELEPHONE ENCOUNTER
Refilled 1 time only, please call patient to schedule for PE- plus- KP patient.  MANDEEP Shabazz CNP

## 2025-07-29 DIAGNOSIS — I10 BENIGN ESSENTIAL HYPERTENSION: ICD-10-CM

## 2025-07-29 RX ORDER — LISINOPRIL 20 MG/1
20 TABLET ORAL DAILY
Qty: 90 TABLET | OUTPATIENT
Start: 2025-07-29

## 2025-07-29 RX ORDER — LISINOPRIL 20 MG/1
20 TABLET ORAL DAILY
Qty: 30 TABLET | Refills: 0 | Status: SHIPPED | OUTPATIENT
Start: 2025-07-29

## 2025-08-07 ENCOUNTER — PATIENT OUTREACH (OUTPATIENT)
Dept: CARE COORDINATION | Facility: CLINIC | Age: 55
End: 2025-08-07
Payer: COMMERCIAL

## 2025-08-21 ENCOUNTER — OFFICE VISIT (OUTPATIENT)
Dept: FAMILY MEDICINE | Facility: CLINIC | Age: 55
End: 2025-08-21
Payer: COMMERCIAL

## 2025-08-21 VITALS
TEMPERATURE: 97.5 F | OXYGEN SATURATION: 97 % | BODY MASS INDEX: 26.84 KG/M2 | RESPIRATION RATE: 12 BRPM | HEIGHT: 70 IN | SYSTOLIC BLOOD PRESSURE: 122 MMHG | DIASTOLIC BLOOD PRESSURE: 86 MMHG | HEART RATE: 71 BPM | WEIGHT: 187.5 LBS

## 2025-08-21 DIAGNOSIS — H72.91 PERFORATED RIGHT TYMPANIC MEMBRANE ON EXAMINATION: ICD-10-CM

## 2025-08-21 DIAGNOSIS — E83.52 SERUM CALCIUM ELEVATED: ICD-10-CM

## 2025-08-21 DIAGNOSIS — Z00.00 ROUTINE GENERAL MEDICAL EXAMINATION AT A HEALTH CARE FACILITY: Primary | ICD-10-CM

## 2025-08-21 DIAGNOSIS — I10 BENIGN ESSENTIAL HYPERTENSION: ICD-10-CM

## 2025-08-21 DIAGNOSIS — Z12.11 SCREEN FOR COLON CANCER: ICD-10-CM

## 2025-08-21 DIAGNOSIS — Z12.5 SCREENING FOR PROSTATE CANCER: ICD-10-CM

## 2025-08-21 LAB
ANION GAP SERPL CALCULATED.3IONS-SCNC: 10 MMOL/L (ref 7–15)
BUN SERPL-MCNC: 13.2 MG/DL (ref 6–20)
CALCIUM SERPL-MCNC: 9.4 MG/DL (ref 8.8–10.4)
CHLORIDE SERPL-SCNC: 105 MMOL/L (ref 98–107)
CHOLEST SERPL-MCNC: 192 MG/DL
CREAT SERPL-MCNC: 1.24 MG/DL (ref 0.67–1.17)
EGFRCR SERPLBLD CKD-EPI 2021: 69 ML/MIN/1.73M2
FASTING STATUS PATIENT QL REPORTED: YES
FASTING STATUS PATIENT QL REPORTED: YES
GLUCOSE SERPL-MCNC: 96 MG/DL (ref 70–99)
HCO3 SERPL-SCNC: 26 MMOL/L (ref 22–29)
HDLC SERPL-MCNC: 53 MG/DL
LDLC SERPL CALC-MCNC: 127 MG/DL
NONHDLC SERPL-MCNC: 139 MG/DL
POTASSIUM SERPL-SCNC: 4.5 MMOL/L (ref 3.4–5.3)
PSA SERPL DL<=0.01 NG/ML-MCNC: 0.73 NG/ML (ref 0–3.5)
PTH-INTACT SERPL-MCNC: 23 PG/ML (ref 15–65)
SODIUM SERPL-SCNC: 141 MMOL/L (ref 135–145)
TRIGL SERPL-MCNC: 59 MG/DL
VIT D+METAB SERPL-MCNC: 61 NG/ML (ref 20–50)

## 2025-08-21 RX ORDER — LISINOPRIL 20 MG/1
20 TABLET ORAL DAILY
Qty: 90 TABLET | Refills: 3 | Status: SHIPPED | OUTPATIENT
Start: 2025-08-21

## 2025-08-21 SDOH — HEALTH STABILITY: PHYSICAL HEALTH: ON AVERAGE, HOW MANY MINUTES DO YOU ENGAGE IN EXERCISE AT THIS LEVEL?: 30 MIN

## 2025-08-21 SDOH — HEALTH STABILITY: PHYSICAL HEALTH: ON AVERAGE, HOW MANY DAYS PER WEEK DO YOU ENGAGE IN MODERATE TO STRENUOUS EXERCISE (LIKE A BRISK WALK)?: 2 DAYS

## 2025-08-21 ASSESSMENT — SOCIAL DETERMINANTS OF HEALTH (SDOH): HOW OFTEN DO YOU GET TOGETHER WITH FRIENDS OR RELATIVES?: ONCE A WEEK

## 2025-08-21 ASSESSMENT — PAIN SCALES - GENERAL: PAINLEVEL_OUTOF10: NO PAIN (0)
